# Patient Record
Sex: FEMALE | Race: OTHER | HISPANIC OR LATINO
[De-identification: names, ages, dates, MRNs, and addresses within clinical notes are randomized per-mention and may not be internally consistent; named-entity substitution may affect disease eponyms.]

---

## 2018-08-03 ENCOUNTER — APPOINTMENT (OUTPATIENT)
Dept: VASCULAR SURGERY | Facility: CLINIC | Age: 73
End: 2018-08-03
Payer: MEDICARE

## 2018-08-03 ENCOUNTER — APPOINTMENT (OUTPATIENT)
Dept: ORTHOPEDIC SURGERY | Facility: CLINIC | Age: 73
End: 2018-08-03
Payer: MEDICARE

## 2018-08-03 VITALS
DIASTOLIC BLOOD PRESSURE: 80 MMHG | WEIGHT: 189 LBS | SYSTOLIC BLOOD PRESSURE: 160 MMHG | HEIGHT: 62 IN | HEART RATE: 94 BPM | BODY MASS INDEX: 34.78 KG/M2

## 2018-08-03 DIAGNOSIS — M79.89 PAIN IN LEG, UNSPECIFIED: ICD-10-CM

## 2018-08-03 DIAGNOSIS — Z86.39 PERSONAL HISTORY OF OTHER ENDOCRINE, NUTRITIONAL AND METABOLIC DISEASE: ICD-10-CM

## 2018-08-03 DIAGNOSIS — Z86.79 PERSONAL HISTORY OF OTHER DISEASES OF THE CIRCULATORY SYSTEM: ICD-10-CM

## 2018-08-03 DIAGNOSIS — M79.606 PAIN IN LEG, UNSPECIFIED: ICD-10-CM

## 2018-08-03 DIAGNOSIS — Z80.3 FAMILY HISTORY OF MALIGNANT NEOPLASM OF BREAST: ICD-10-CM

## 2018-08-03 PROCEDURE — 73564 X-RAY EXAM KNEE 4 OR MORE: CPT | Mod: 50

## 2018-08-03 PROCEDURE — 99204 OFFICE O/P NEW MOD 45 MIN: CPT

## 2018-08-03 PROCEDURE — 99203 OFFICE O/P NEW LOW 30 MIN: CPT

## 2018-08-03 PROCEDURE — 93970 EXTREMITY STUDY: CPT

## 2018-08-03 RX ORDER — DICLOFENAC SODIUM 16.05 MG/ML
1.5 SOLUTION TOPICAL
Qty: 1 | Refills: 0 | Status: ACTIVE | COMMUNITY
Start: 2018-08-03 | End: 1900-01-01

## 2018-08-03 RX ORDER — LOSARTAN POTASSIUM 100 MG/1
TABLET, FILM COATED ORAL
Refills: 0 | Status: ACTIVE | COMMUNITY

## 2018-08-03 RX ORDER — PANTOPRAZOLE 40 MG/1
40 TABLET, DELAYED RELEASE ORAL
Refills: 0 | Status: ACTIVE | COMMUNITY

## 2018-08-03 RX ORDER — ASPIRIN 81 MG
81 TABLET, DELAYED RELEASE (ENTERIC COATED) ORAL
Refills: 0 | Status: ACTIVE | COMMUNITY

## 2018-08-03 RX ORDER — METOPROLOL TARTRATE 25 MG/1
25 TABLET, FILM COATED ORAL
Refills: 0 | Status: ACTIVE | COMMUNITY

## 2018-08-03 RX ORDER — ROSUVASTATIN CALCIUM 20 MG/1
20 TABLET, FILM COATED ORAL
Refills: 0 | Status: ACTIVE | COMMUNITY

## 2019-06-24 ENCOUNTER — APPOINTMENT (OUTPATIENT)
Dept: ORTHOPEDIC SURGERY | Facility: CLINIC | Age: 74
End: 2019-06-24
Payer: MEDICARE

## 2019-06-24 PROCEDURE — 73564 X-RAY EXAM KNEE 4 OR MORE: CPT | Mod: 50

## 2019-06-24 PROCEDURE — 99214 OFFICE O/P EST MOD 30 MIN: CPT

## 2019-06-24 RX ORDER — DICLOFENAC SODIUM 16.05 MG/ML
1.5 SOLUTION TOPICAL
Qty: 1 | Refills: 0 | Status: ACTIVE | COMMUNITY
Start: 2019-06-24 | End: 1900-01-01

## 2019-06-24 RX ORDER — DICLOFENAC SODIUM 10 MG/G
1 GEL TOPICAL DAILY
Qty: 1 | Refills: 2 | Status: ACTIVE | COMMUNITY
Start: 2019-06-24 | End: 1900-01-01

## 2019-06-24 NOTE — PHYSICAL EXAM
[de-identified] : 4 views of the affected bilateral knee (standing AP, flexing standing AP, 30degree flexed lateral, 0degree lateral, sunrise view)\par Aug 2018\par  demonstrate:\par \par RIGHT\par There is severe medial weightbearing asymmetric narrowing\par moderate osteophytic lipping\par Trace suprapatellar effusion\par Mild patellofemoral joint space loss without evidence of tilt or subluxation on sunrise view\par Normal soft tissue density\par Otherwise normal osseous bone structure without fracture or dislocation\par \par LEFT\par There is severe medial weightbearing asymmetric narrowing\par Large osteophytic lipping\par Trace suprapatellar effusion\par Trace patellofemoral joint space loss without evidence of tilt or subluxation on sunrise view\par Normal soft tissue density\par Otherwise normal osseous bone structure without fracture or dislocation\par \par 4 views of the affected bilateral knee (standing AP, flexing standing AP, 30degree flexed lateral, 0degree lateral, sunrise view)\par were ordered, obtained and evaluated by myself today and \par demonstrate:\par \par RIGHT\par There is severe medial weightbearing asymmetric narrowing\par moderate osteophytic lipping\par Trace suprapatellar effusion\par Mild patellofemoral joint space loss without evidence of tilt or subluxation on sunrise view\par Normal soft tissue density\par Otherwise normal osseous bone structure without fracture or dislocation\par \par LEFT\par There is severe medial weightbearing asymmetric narrowing\par Large osteophytic lipping\par Trace suprapatellar effusion\par Trace patellofemoral joint space loss without evidence of tilt or subluxation on sunrise view\par Normal soft tissue density\par Otherwise normal osseous bone structure without fracture or dislocation [de-identified] : Physical Examination\par General: well nourished, in no acute distress, alert and oriented to person, place and time\par Psychiatric: normal mood and affect, no abnormal movements or speech patterns\par Eyes: vision intact wo glasses\par Throat: no thyromegaly\par Lymph: no enlarged nodes, no lymphedema in extremity\par Respiratory: no wheezing, no shortness of breath with ambulation\par Cardiac: no cardiac leg swelling, 2+ peripheral pulses\par Neurology: normal gross sensation in extremities to light touch\par Abdomen: soft, non-tender, tympanic, no masses\par \par Musculoskeletal Examination\par Ambulation	BL LE antalgic gait, + assistive devices\par \par Knee			Right			Left\par General\par      Swelling/Deformity	nonpitting swelling			nonpitting swelling\par      Skin			normal			normal\par      Erythema		-			-\par      Standing Alignment	varus			varus\par      Effusion		trace			trace\par Range of Motion\par      Hip			full painless ROM		full painless ROM\par      Knee Flexion		100			100\par      Knee Extension	0			0\par Patella\par      J Sign		-			-\par      Quad Medial/Lateral	1/1 1/1\par      Apprehension		-			-\par      Heriberto's		+			+\par      Grind Sign		+			+\par      Crepitus		+			+\par Palpation\par      Medial Joint Line	+			+\par      Medial Fem Condyle	-			-\par      Lateral Joint Line	+			+\par      Quad Tendon		-			-\par      Patella Tendon	-			-\par      Medial Patella		-			-\par      Lateral Patella 	+			+\par      Posterior Knee	-			-\par Ligamentous\par      Varus @ 0° / 30°	-/-			-/-\par      Valgus @ 0° / 30°	-/-			-/-\par      Lachman		-			-\par      Pivot Shift		-			-\par      Anterior Drawer	-			-\par      Posterior Drawer	-			-\par Meniscus\par      Daniel		-			-\par      Flexion Pinch		-			-\par Strength Examination/Atrophy\par      Hip Flexors 		5+			5+\par      Quadriceps		5+			5+\par      Hamstring		5+			5+\par      Tibialis Anterior	5+			5+\par      Achilles/Soleus	5+			5+\par Sensation\par      Deep Peroneal	normal			normal\par      Superficial Peroneal 	normal			normal\par      Sural  		normal			normal\par      Posterior Tibial 	normal			normal\par      Saphneous 		normal			normal\par Pulses\par      DP			2+			2+\par

## 2019-06-24 NOTE — HISTORY OF PRESENT ILLNESS
[de-identified] : CC bilateral knee\par Consult Dr Robinson Silveira\par HPI 73 yo female right HD presents for FU chronic onset of in a year as a constant pain in the entire bilateral knee without injury. The pain is worse, and rated a 10 out of 10, described as dull aching sharp throbbing, with radiation down the calf laterally more on the left. Nothing makes the pain better and everything makes the pain worse. The patient reports associated symptoms of top click grinding swelling weakness instability. The patient - pain at night affecting sleep, and + similar pain previously treated nonoperatively.\par \par The patient has tried the following treatments:\par Activity modification	+\par Ice/Compression  	+\par Braces    		-\par Nsaids    		+\par Physical Therapy 	-\par Cortisone Injection	-\par Arthroscopy		-\par \par knee pain better, didn't do pt, took nsaids. concerned leg swelling increasing slowly, doesn't remember what vascular doctor told her\par \par Review of Systems is positive for the above musculoskeletal symptoms and is otherwise non-contributory for general, constitutional, psychiatric, neurologic, HEENT, cardiac, respiratory, gastrointestinal, reproductive, lymphatic, and dermatologic complaints.\par \par \par

## 2019-06-24 NOTE — DISCUSSION/SUMMARY
[de-identified] : Bilateral knee osteoarthritis\par \par The patient and I discussed the causes and progression of degenerative joint disease of the knee. Models, diagrams and drawings were used in the discussion. Treatment can include conservative non-operative management and surgical options. Conservative management includes weight loss, activity modification, physical therapy to improve motion and strength in the muscles around the knee and the body's core, PO and topical NSAIDs, corticosteroid and/or viscosupplementation intra-articular injections. If the patient fails to improve with non-operative management, surgical management is possible. Depending upon the patient's age, BMI, activity level, degree and location of arthrosis different surgical options are possible including arthroscopic debridement with chondroplasty, high-tibial osteotomy, unicondylar/partial arthroplasty, and total joint arthroplasty.\par \par Physical therapy was prescribed for knee ROM exercises, strengthening exercises, deep tissue massage, core strengthening, hip abductor strengthening, VMO strengthening, modalities PRN, and home exercises\par \par The patient was prescribed Diclofenac topical liquid/creme non-steroidal anti-inflammatory medication. 1-2 pumps twice daily and apply to area with pain. There is low systemic absorption of the medication but risks while reduced remain were discussed and include but not limited to renal damage and GI ulceration and bleeding. They were warned to stop the medication if worsening buring skin or gastric pain or dizziness or other side effects. Also to immediately stop the medication and seek appriate medical attention if any severe stomach ache, gastritis, black/red vomit, black/red stools or any other medical concern.\par \par Patient was offered a corticosteroid injection and she declined\par patient not interested in surgery.\par \par asked about orthopedic treatment of leg swelling, discussed she should speak to PCP or vascular about leg swelling\par \par Follow up\par PRN

## 2019-07-06 ENCOUNTER — RECORD ABSTRACTING (OUTPATIENT)
Age: 74
End: 2019-07-06

## 2019-07-06 DIAGNOSIS — Z87.19 PERSONAL HISTORY OF OTHER DISEASES OF THE DIGESTIVE SYSTEM: ICD-10-CM

## 2019-07-06 DIAGNOSIS — L29.2 PRURITUS VULVAE: ICD-10-CM

## 2019-07-06 DIAGNOSIS — Z98.890 OTHER SPECIFIED POSTPROCEDURAL STATES: ICD-10-CM

## 2019-07-06 DIAGNOSIS — Z92.89 PERSONAL HISTORY OF OTHER MEDICAL TREATMENT: ICD-10-CM

## 2019-07-06 DIAGNOSIS — Z82.49 FAMILY HISTORY OF ISCHEMIC HEART DISEASE AND OTHER DISEASES OF THE CIRCULATORY SYSTEM: ICD-10-CM

## 2019-07-06 DIAGNOSIS — N95.2 POSTMENOPAUSAL ATROPHIC VAGINITIS: ICD-10-CM

## 2019-07-06 LAB — CYTOLOGY CVX/VAG DOC THIN PREP: NORMAL

## 2019-07-06 RX ORDER — PHENOL 1.4 %
600-400 AEROSOL, SPRAY (ML) MUCOUS MEMBRANE
Refills: 0 | Status: ACTIVE | COMMUNITY

## 2019-07-08 ENCOUNTER — LABORATORY RESULT (OUTPATIENT)
Age: 74
End: 2019-07-08

## 2019-07-09 ENCOUNTER — APPOINTMENT (OUTPATIENT)
Dept: OBGYN | Facility: CLINIC | Age: 74
End: 2019-07-09
Payer: MEDICARE

## 2019-07-09 VITALS
HEART RATE: 91 BPM | TEMPERATURE: 97.6 F | OXYGEN SATURATION: 98 % | BODY MASS INDEX: 36.58 KG/M2 | WEIGHT: 200 LBS | DIASTOLIC BLOOD PRESSURE: 77 MMHG | SYSTOLIC BLOOD PRESSURE: 131 MMHG

## 2019-07-09 PROCEDURE — 99387 INIT PM E/M NEW PAT 65+ YRS: CPT

## 2019-07-24 ENCOUNTER — APPOINTMENT (OUTPATIENT)
Dept: ORTHOPEDIC SURGERY | Facility: CLINIC | Age: 74
End: 2019-07-24

## 2019-10-18 ENCOUNTER — APPOINTMENT (OUTPATIENT)
Dept: VASCULAR SURGERY | Facility: CLINIC | Age: 74
End: 2019-10-18
Payer: MEDICARE

## 2019-10-18 VITALS
HEART RATE: 96 BPM | DIASTOLIC BLOOD PRESSURE: 78 MMHG | WEIGHT: 200 LBS | BODY MASS INDEX: 36.8 KG/M2 | SYSTOLIC BLOOD PRESSURE: 125 MMHG | HEIGHT: 62 IN

## 2019-10-18 PROCEDURE — 99213 OFFICE O/P EST LOW 20 MIN: CPT

## 2019-10-18 PROCEDURE — 93970 EXTREMITY STUDY: CPT

## 2019-10-18 NOTE — ASSESSMENT
[FreeTextEntry1] : Patient with left Baker's cyst. No significant arterial or venous insufficiency. Recommend orthopedic evaluation.

## 2019-10-18 NOTE — PHYSICAL EXAM
[JVD] : no jugular venous distention  [Normal Breath Sounds] : Normal breath sounds [Normal Heart Sounds] : normal heart sounds [2+] : left 2+ [Ankle Swelling (On Exam)] : present [Ankle Swelling On The Left] : of the left ankle [Ankle Swelling On The Right] : mild [Varicose Veins Of Lower Extremities] : not present [] : not present [Skin Ulcer] : no ulcer [Abdomen Masses] : No abdominal masses [Oriented to Place] : oriented to place

## 2019-10-18 NOTE — HISTORY OF PRESENT ILLNESS
[FreeTextEntry1] : Patient is a 72-year-old female with past medical history significant for obesity and arthritis and hypertension presenting with complaints of left leg swelling and left leg pain. The pain involves the posterior left knee and left calf area. No complaint of rest pain or claudication. No history of smoking. No history of diabetes. No history of cardiac disease. Symptoms have been present for many months. Symptoms are getting worse.

## 2020-07-13 ENCOUNTER — APPOINTMENT (OUTPATIENT)
Dept: ORTHOPEDIC SURGERY | Facility: CLINIC | Age: 75
End: 2020-07-13
Payer: MEDICARE

## 2020-07-16 ENCOUNTER — APPOINTMENT (OUTPATIENT)
Dept: ORTHOPEDIC SURGERY | Facility: CLINIC | Age: 75
End: 2020-07-16
Payer: MEDICARE

## 2020-07-16 VITALS
HEART RATE: 100 BPM | DIASTOLIC BLOOD PRESSURE: 88 MMHG | BODY MASS INDEX: 34.78 KG/M2 | TEMPERATURE: 97.2 F | SYSTOLIC BLOOD PRESSURE: 138 MMHG | HEIGHT: 62 IN | WEIGHT: 189 LBS

## 2020-07-16 PROCEDURE — 20612 ASPIRATE/INJ GANGLION CYST: CPT

## 2020-07-16 PROCEDURE — 99203 OFFICE O/P NEW LOW 30 MIN: CPT | Mod: 25

## 2020-07-20 ENCOUNTER — APPOINTMENT (OUTPATIENT)
Dept: OBGYN | Facility: CLINIC | Age: 75
End: 2020-07-20
Payer: MEDICARE

## 2020-07-20 VITALS
BODY MASS INDEX: 34.57 KG/M2 | OXYGEN SATURATION: 97 % | DIASTOLIC BLOOD PRESSURE: 76 MMHG | TEMPERATURE: 98.9 F | SYSTOLIC BLOOD PRESSURE: 149 MMHG | HEART RATE: 98 BPM | WEIGHT: 189 LBS

## 2020-07-20 DIAGNOSIS — Z78.9 OTHER SPECIFIED HEALTH STATUS: ICD-10-CM

## 2020-07-20 DIAGNOSIS — Z87.39 PERSONAL HISTORY OF OTHER DISEASES OF THE MUSCULOSKELETAL SYSTEM AND CONNECTIVE TISSUE: ICD-10-CM

## 2020-07-20 DIAGNOSIS — Z82.61 FAMILY HISTORY OF ARTHRITIS: ICD-10-CM

## 2020-07-20 PROCEDURE — 99397 PER PM REEVAL EST PAT 65+ YR: CPT

## 2020-07-20 RX ORDER — NITROFURANTOIN (MONOHYDRATE/MACROCRYSTALS) 25; 75 MG/1; MG/1
100 CAPSULE ORAL
Qty: 14 | Refills: 0 | Status: COMPLETED | COMMUNITY
Start: 2020-06-25

## 2020-07-20 RX ORDER — AMLODIPINE BESYLATE 2.5 MG/1
2.5 TABLET ORAL
Qty: 90 | Refills: 0 | Status: ACTIVE | COMMUNITY
Start: 2020-05-29

## 2020-07-20 NOTE — PHYSICAL EXAM
[Awake] : awake [Acute Distress] : no acute distress [Mass] : no breast mass [Alert] : alert [Nipple Discharge] : no nipple discharge [Axillary LAD] : no axillary lymphadenopathy [Soft] : soft [Tender] : non tender [Oriented x3] : oriented to person, place, and time [Normal] : cervix [No Bleeding] : there was no active vaginal bleeding [Uterine Adnexae] : were not tender and not enlarged

## 2020-07-20 NOTE — HISTORY OF PRESENT ILLNESS
[Normal Amount/Duration] : was of a normal amount and duration [Regular Cycle Intervals] : periods have been regular [Definite:  ___ (Date)] : the last menstrual period was [unfilled] [Male ___] : [unfilled] male [Menarche Age: ____] : age at menarche was [unfilled] [Sexually Active] : is sexually active

## 2020-08-14 ENCOUNTER — APPOINTMENT (OUTPATIENT)
Dept: VASCULAR SURGERY | Facility: CLINIC | Age: 75
End: 2020-08-14
Payer: MEDICARE

## 2020-08-14 VITALS
OXYGEN SATURATION: 98 % | HEART RATE: 97 BPM | TEMPERATURE: 97 F | WEIGHT: 189 LBS | DIASTOLIC BLOOD PRESSURE: 78 MMHG | BODY MASS INDEX: 34.78 KG/M2 | SYSTOLIC BLOOD PRESSURE: 144 MMHG | HEIGHT: 62 IN

## 2020-08-14 VITALS — TEMPERATURE: 96.4 F

## 2020-08-14 PROCEDURE — 99213 OFFICE O/P EST LOW 20 MIN: CPT

## 2020-08-14 NOTE — HISTORY OF PRESENT ILLNESS
[FreeTextEntry1] : 75 yo female with history of htn, hld presents for follow up of lower extremity edema.  pt states that by the end of the day the swelling increased and the leg feels heavy.  pt states that the left is worse then the right.  pt attempted to use the compression stockings without much improvement \par pt denies any history of dvt , open wounds or claudications

## 2020-08-14 NOTE — ASSESSMENT
[FreeTextEntry1] : 73 yo female with history of htn, hld presents for follow up of lower extremity edema.  pt states that by the end of the day the swelling increased and the leg feels heavy.\par \par venous duplex from previous visit shows no evidence of venous insufficency\par pt currently on amlodipine pt advised to follow up with pcp for possible medication change in addition to compression and elevation \par pt to follow up as needed

## 2020-08-14 NOTE — PHYSICAL EXAM
[2+] : left 2+ [Ankle Swelling On The Right] : mild [Varicose Veins Of Lower Extremities] : bilaterally [Alert] : alert [No Rash or Lesion] : No rash or lesion [Calm] : calm [Ankle Swelling (On Exam)] : not present [] : not present [Skin Ulcer] : no ulcer [de-identified] : appears well  [de-identified] : no calf tenderness

## 2021-06-09 ENCOUNTER — APPOINTMENT (OUTPATIENT)
Dept: ORTHOPEDIC SURGERY | Facility: CLINIC | Age: 76
End: 2021-06-09
Payer: MEDICARE

## 2021-06-09 VITALS
HEIGHT: 62 IN | BODY MASS INDEX: 32.2 KG/M2 | HEART RATE: 89 BPM | SYSTOLIC BLOOD PRESSURE: 137 MMHG | WEIGHT: 175 LBS | DIASTOLIC BLOOD PRESSURE: 74 MMHG

## 2021-06-09 DIAGNOSIS — M17.0 BILATERAL PRIMARY OSTEOARTHRITIS OF KNEE: ICD-10-CM

## 2021-06-09 DIAGNOSIS — M71.22 SYNOVIAL CYST OF POPLITEAL SPACE [BAKER], LEFT KNEE: ICD-10-CM

## 2021-06-09 PROCEDURE — 99072 ADDL SUPL MATRL&STAF TM PHE: CPT

## 2021-06-09 PROCEDURE — 73030 X-RAY EXAM OF SHOULDER: CPT | Mod: LT

## 2021-06-09 PROCEDURE — 99213 OFFICE O/P EST LOW 20 MIN: CPT | Mod: 25

## 2021-06-09 PROCEDURE — 20610 DRAIN/INJ JOINT/BURSA W/O US: CPT | Mod: LT

## 2021-07-14 ENCOUNTER — APPOINTMENT (OUTPATIENT)
Dept: ORTHOPEDIC SURGERY | Facility: CLINIC | Age: 76
End: 2021-07-14

## 2021-09-22 ENCOUNTER — OUTPATIENT (OUTPATIENT)
Dept: OUTPATIENT SERVICES | Facility: HOSPITAL | Age: 76
LOS: 1 days | End: 2021-09-22
Payer: MEDICARE

## 2021-09-22 VITALS
RESPIRATION RATE: 17 BRPM | HEART RATE: 88 BPM | DIASTOLIC BLOOD PRESSURE: 72 MMHG | HEIGHT: 61 IN | TEMPERATURE: 98 F | SYSTOLIC BLOOD PRESSURE: 154 MMHG | WEIGHT: 175.05 LBS | OXYGEN SATURATION: 96 %

## 2021-09-22 DIAGNOSIS — D17.0 BENIGN LIPOMATOUS NEOPLASM OF SKIN AND SUBCUTANEOUS TISSUE OF HEAD, FACE AND NECK: ICD-10-CM

## 2021-09-22 DIAGNOSIS — Z90.49 ACQUIRED ABSENCE OF OTHER SPECIFIED PARTS OF DIGESTIVE TRACT: Chronic | ICD-10-CM

## 2021-09-22 DIAGNOSIS — Z98.891 HISTORY OF UTERINE SCAR FROM PREVIOUS SURGERY: Chronic | ICD-10-CM

## 2021-09-22 DIAGNOSIS — Z29.9 ENCOUNTER FOR PROPHYLACTIC MEASURES, UNSPECIFIED: ICD-10-CM

## 2021-09-22 DIAGNOSIS — I10 ESSENTIAL (PRIMARY) HYPERTENSION: ICD-10-CM

## 2021-09-22 DIAGNOSIS — K40.90 UNILATERAL INGUINAL HERNIA, WITHOUT OBSTRUCTION OR GANGRENE, NOT SPECIFIED AS RECURRENT: Chronic | ICD-10-CM

## 2021-09-22 DIAGNOSIS — Z01.818 ENCOUNTER FOR OTHER PREPROCEDURAL EXAMINATION: ICD-10-CM

## 2021-09-22 PROCEDURE — G0463: CPT

## 2021-09-22 NOTE — H&P PST ADULT - NSICDXPASTMEDICALHX_GEN_ALL_CORE_FT
PAST MEDICAL HISTORY:  Benign lipomatous neoplasm of skin and subcutaneous tissue of head, face and neck     GERD (gastroesophageal reflux disease)     HTN (hypertension)     Hypercholesteremia

## 2021-09-22 NOTE — H&P PST ADULT - NSICDXFAMILYHX_GEN_ALL_CORE_FT
FAMILY HISTORY:  Mother  Still living? No  FH: breast cancer, Age at diagnosis: Age Unknown    Sibling  Still living? No  Family hx of colon cancer, Age at diagnosis: Age Unknown

## 2021-09-22 NOTE — H&P PST ADULT - HISTORY OF PRESENT ILLNESS
75 year old pleasant female with history of HTN, HDL, presents with benign lipomatous neoplasm of skin and subcutaneous tissue of head ,face and neck. Pt noticed this lump for sometime and brought it to her primary doctor's attention. Pt seen by surgeon and ow schedule for excision and closure of left supraclavicular mass on 9/29/2021. Pt also instructed on the use of chlorhexidine wash the day of surgery. Pt verbalized understanding 75 year old pleasant female with history of HTN, HDL, presents with benign lipomatous neoplasm of skin and subcutaneous tissue of head ,face and neck. Pt noticed this lump for sometime and brought it to her primary doctor's attention. Pt seen by surgeon and ow schedule for excision and closure of left supraclavicular mass on 9/29/2021. Pt also instructed on the use of chlorhexidine wash the day of surgery. Pt verbalized understanding. C/O pain.

## 2021-09-22 NOTE — H&P PST ADULT - NSANTHOSAYNRD_GEN_A_CORE
No. MOISE screening performed.  STOP BANG Legend: 0-2 = LOW Risk; 3-4 = INTERMEDIATE Risk; 5-8 = HIGH Risk

## 2021-09-22 NOTE — H&P PST ADULT - NEGATIVE GENERAL GENITOURINARY SYMPTOMS
no hematuria/no renal colic/no flank pain L/no flank pain R/no urine discoloration/no gas in urine/no bladder infections

## 2021-09-22 NOTE — H&P PST ADULT - ASSESSMENT
75 yr old pleasant woman with history of HTN, hypercholesteremia GERD presents with benign lipomatous neoplasm of skin and subcutaneous tissue of head, face and neck. Pt seen by surgeon for this lump in her neck area. Pt schedule for excision and closure of left supraclavicular mass on 9/29/2021.

## 2021-09-25 DIAGNOSIS — Z01.818 ENCOUNTER FOR OTHER PREPROCEDURAL EXAMINATION: ICD-10-CM

## 2021-09-26 ENCOUNTER — APPOINTMENT (OUTPATIENT)
Dept: DISASTER EMERGENCY | Facility: CLINIC | Age: 76
End: 2021-09-26

## 2021-09-28 LAB — SARS-COV-2 N GENE NPH QL NAA+PROBE: NOT DETECTED

## 2021-09-29 ENCOUNTER — RESULT REVIEW (OUTPATIENT)
Age: 76
End: 2021-09-29

## 2021-09-29 ENCOUNTER — OUTPATIENT (OUTPATIENT)
Dept: OUTPATIENT SERVICES | Facility: HOSPITAL | Age: 76
LOS: 1 days | End: 2021-09-29
Payer: MEDICARE

## 2021-09-29 VITALS
RESPIRATION RATE: 16 BRPM | DIASTOLIC BLOOD PRESSURE: 52 MMHG | WEIGHT: 175.05 LBS | HEIGHT: 61 IN | OXYGEN SATURATION: 97 % | HEART RATE: 84 BPM | TEMPERATURE: 98 F | SYSTOLIC BLOOD PRESSURE: 145 MMHG

## 2021-09-29 VITALS
RESPIRATION RATE: 16 BRPM | SYSTOLIC BLOOD PRESSURE: 106 MMHG | HEART RATE: 82 BPM | DIASTOLIC BLOOD PRESSURE: 58 MMHG | TEMPERATURE: 98 F | OXYGEN SATURATION: 96 %

## 2021-09-29 DIAGNOSIS — Z90.49 ACQUIRED ABSENCE OF OTHER SPECIFIED PARTS OF DIGESTIVE TRACT: Chronic | ICD-10-CM

## 2021-09-29 DIAGNOSIS — K40.90 UNILATERAL INGUINAL HERNIA, WITHOUT OBSTRUCTION OR GANGRENE, NOT SPECIFIED AS RECURRENT: Chronic | ICD-10-CM

## 2021-09-29 DIAGNOSIS — D17.0 BENIGN LIPOMATOUS NEOPLASM OF SKIN AND SUBCUTANEOUS TISSUE OF HEAD, FACE AND NECK: ICD-10-CM

## 2021-09-29 DIAGNOSIS — Z98.891 HISTORY OF UTERINE SCAR FROM PREVIOUS SURGERY: Chronic | ICD-10-CM

## 2021-09-29 PROCEDURE — 14041 TIS TRNFR F/C/C/M/N/A/G/H/F: CPT

## 2021-09-29 PROCEDURE — 88305 TISSUE EXAM BY PATHOLOGIST: CPT | Mod: 26

## 2021-09-29 PROCEDURE — 21554 EXC NECK TUM DEEP 5 CM/>: CPT

## 2021-09-29 PROCEDURE — 88305 TISSUE EXAM BY PATHOLOGIST: CPT

## 2021-09-29 RX ORDER — ACETAMINOPHEN 500 MG
650 TABLET ORAL EVERY 6 HOURS
Refills: 0 | Status: DISCONTINUED | OUTPATIENT
Start: 2021-09-29 | End: 2021-10-06

## 2021-09-29 RX ORDER — SODIUM CHLORIDE 9 MG/ML
3 INJECTION INTRAMUSCULAR; INTRAVENOUS; SUBCUTANEOUS EVERY 8 HOURS
Refills: 0 | Status: DISCONTINUED | OUTPATIENT
Start: 2021-09-29 | End: 2021-09-29

## 2021-09-29 RX ORDER — FENTANYL CITRATE 50 UG/ML
25 INJECTION INTRAVENOUS
Refills: 0 | Status: DISCONTINUED | OUTPATIENT
Start: 2021-09-29 | End: 2021-09-29

## 2021-09-29 RX ORDER — FENTANYL CITRATE 50 UG/ML
50 INJECTION INTRAVENOUS
Refills: 0 | Status: DISCONTINUED | OUTPATIENT
Start: 2021-09-29 | End: 2021-09-29

## 2021-09-29 RX ADMIN — SODIUM CHLORIDE 3 MILLILITER(S): 9 INJECTION INTRAMUSCULAR; INTRAVENOUS; SUBCUTANEOUS at 12:11

## 2021-09-29 NOTE — ASU DISCHARGE PLAN (ADULT/PEDIATRIC) - ASU DC SPECIAL INSTRUCTIONSFT
Please follow-up with your surgeon in 1 week. Drink plenty of fluids and rest as needed. Call for any fever over 101, nausea, vomiting, severe pain, no passing of gas or bowel movement.       SURGICAL SITES : Remove outer dressing and keep white steri-strips in place allowing them to fall off on their own. You may shower 48 hours post-operatively but do not bathe or soak in the water for 1-2 weeks; pat dry. If you notice any signs of surgical site infection (ie. redness, swelling, pain, pus drainage), please seek medical care immediately.       PAIN CONTROL: You may take Motrin 600mg-800mg (with food) every 6 hours or Tylenol 650mg-1000mg every 6 hours as needed for mild pain. Stagger one medication 3 hours after the other for maximum pain control. Maximum daily dose of Tylenol should not exceed 4000mg/day.  You may take your prescribed pain medication given to you by Dr. Mandujano for any severe breakthrough pain not that is not relieved by Tylenol/Motrin. Do not drive or make important decisions while taking this medication and do not take more than 4 pills in 24 hours. Please follow-up with your surgeon in 2 weeks; call the office to make an appointment. Drink plenty of fluids and rest as needed. Call for any fever over 101, nausea, vomiting or severe pain.      SURGICAL SITES : Remove outer dressing and keep white steri-strips in place allowing them to fall off on their own. You may shower 48 hours post-operatively but do not bathe or soak in the water for 1-2 weeks; pat dry. If you notice any signs of surgical site infection (ie. redness, swelling, pain, pus drainage), please seek medical care immediately.       PAIN CONTROL: You may take Motrin 600mg-800mg (with food) every 6 hours or Tylenol 650mg-1000mg every 6 hours as needed for mild pain. Stagger one medication 3 hours after the other for maximum pain control. Maximum daily dose of Tylenol should not exceed 4000mg/day.  You may take your prescribed pain medication given to you by Dr. Mandujano for any severe breakthrough pain not that is not relieved by Tylenol/Motrin. Do not drive or make important decisions while taking this medication and do not take more than 4 pills in 24 hours. Please follow-up with your surgeon in 2 weeks; call the office to make an appointment. Call for any fever over 101, nausea, vomiting or severe pain.      SURGICAL SITES :  Remove outer dressing and keep white steri-strips in place allowing them to fall off on their own. You may shower 48 hours post-operatively but do not bathe or soak in the water for 1-2 weeks; pat dry. If you notice any signs of surgical site infection (ie. redness, swelling, pain, pus drainage), please seek medical care immediately.       PAIN CONTROL:   You may take Motrin 600mg-800mg (with food) every 6 hours or Tylenol 650mg-1000mg every 6 hours as needed for mild pain. Stagger one medication 3 hours after the other for maximum pain control. Maximum daily dose of Tylenol should not exceed 4000mg/day.    You may take your prescribed pain medication (T#3) given to you by Dr. Mandujano for any severe breakthrough pain not that is not relieved by Tylenol/Motrin. Do not drive or make important decisions while taking this medication and do not take more than 4 pills in 24 hours.    ANTIBIOTICS:  Please take your prescribed antibiotics as directed by your surgeon.

## 2021-09-29 NOTE — ASU DISCHARGE PLAN (ADULT/PEDIATRIC) - CARE PROVIDER_API CALL
Vadim Mandujano  PLASTIC SURGERY  [address] [facility] [address], [address]  [place], [place] [id number]  Phone: [phone]  Fax: [phone]  Follow Up Time:

## 2021-09-29 NOTE — BRIEF OPERATIVE NOTE - DISPOSITION
What Type Of Note Output Would You Prefer (Optional)?: Bullet Format
Is This A New Presentation, Or A Follow-Up?: Skin Lesion
pacu-home

## 2021-09-29 NOTE — BRIEF OPERATIVE NOTE - NSICDXBRIEFPOSTOP_GEN_ALL_CORE_FT
POST-OP DIAGNOSIS:  Supraclavicular mass 29-Sep-2021 15:35:56 left supraclavicular lipoma Monae Alansi

## 2021-10-05 LAB — SURGICAL PATHOLOGY STUDY: SIGNIFICANT CHANGE UP

## 2021-10-20 PROBLEM — I10 ESSENTIAL (PRIMARY) HYPERTENSION: Chronic | Status: ACTIVE | Noted: 2021-09-22

## 2021-10-20 PROBLEM — K21.9 GASTRO-ESOPHAGEAL REFLUX DISEASE WITHOUT ESOPHAGITIS: Chronic | Status: ACTIVE | Noted: 2021-09-22

## 2021-10-20 PROBLEM — E78.00 PURE HYPERCHOLESTEROLEMIA, UNSPECIFIED: Chronic | Status: ACTIVE | Noted: 2021-09-22

## 2021-11-04 ENCOUNTER — APPOINTMENT (OUTPATIENT)
Dept: OBGYN | Facility: CLINIC | Age: 76
End: 2021-11-04
Payer: MEDICARE

## 2021-11-04 VITALS
WEIGHT: 195 LBS | TEMPERATURE: 97.2 F | BODY MASS INDEX: 35.88 KG/M2 | DIASTOLIC BLOOD PRESSURE: 69 MMHG | OXYGEN SATURATION: 98 % | SYSTOLIC BLOOD PRESSURE: 126 MMHG | HEIGHT: 62 IN | HEART RATE: 75 BPM

## 2021-11-04 DIAGNOSIS — Z01.419 ENCOUNTER FOR GYNECOLOGICAL EXAMINATION (GENERAL) (ROUTINE) W/OUT ABNORMAL FINDINGS: ICD-10-CM

## 2021-11-04 PROCEDURE — 99397 PER PM REEVAL EST PAT 65+ YR: CPT

## 2021-11-04 RX ORDER — CLOTRIMAZOLE AND BETAMETHASONE DIPROPIONATE 10; .5 MG/G; MG/G
1-0.05 CREAM TOPICAL TWICE DAILY
Qty: 1 | Refills: 1 | Status: ACTIVE | COMMUNITY
Start: 2021-11-04 | End: 1900-01-01

## 2021-11-04 NOTE — PHYSICAL EXAM
[Chaperone Present] : A chaperone was present in the examining room during all aspects of the physical examination [Appropriately responsive] : appropriately responsive [No Acute Distress] : no acute distress [No Lymphadenopathy] : no lymphadenopathy [Regular Rate Rhythm] : regular rate rhythm [No Murmurs] : no murmurs [Clear to Auscultation B/L] : clear to auscultation bilaterally [Non-tender] : non-tender [Non-distended] : non-distended [No HSM] : No HSM [No Lesions] : no lesions [No Mass] : no mass [Examination Of The Breasts] : a normal appearance [No Masses] : no breast masses were palpable [Labia Majora] : normal [Labia Minora] : normal [Awake] : awake [Alert] : alert [Acute Distress] : no acute distress [Mass] : no breast mass [Nipple Discharge] : no nipple discharge [Axillary LAD] : no axillary lymphadenopathy [Soft] : soft [Tender] : non tender [Oriented x3] : oriented to person, place, and time [Normal] : uterus [No Bleeding] : there was no active vaginal bleeding [Uterine Adnexae] : were not tender and not enlarged

## 2021-11-05 LAB — HPV HIGH+LOW RISK DNA PNL CVX: NOT DETECTED

## 2021-11-09 LAB — CYTOLOGY CVX/VAG DOC THIN PREP: ABNORMAL

## 2022-10-16 ENCOUNTER — EMERGENCY (EMERGENCY)
Facility: HOSPITAL | Age: 77
LOS: 1 days | Discharge: HOME | End: 2022-10-16
Attending: EMERGENCY MEDICINE
Payer: MEDICARE

## 2022-10-16 VITALS
OXYGEN SATURATION: 95 % | SYSTOLIC BLOOD PRESSURE: 125 MMHG | DIASTOLIC BLOOD PRESSURE: 78 MMHG | HEART RATE: 65 BPM | RESPIRATION RATE: 18 BRPM | TEMPERATURE: 99 F

## 2022-10-16 VITALS
TEMPERATURE: 98 F | OXYGEN SATURATION: 100 % | SYSTOLIC BLOOD PRESSURE: 186 MMHG | HEIGHT: 61 IN | HEART RATE: 88 BPM | RESPIRATION RATE: 16 BRPM | DIASTOLIC BLOOD PRESSURE: 84 MMHG | WEIGHT: 180.78 LBS

## 2022-10-16 DIAGNOSIS — K40.90 UNILATERAL INGUINAL HERNIA, WITHOUT OBSTRUCTION OR GANGRENE, NOT SPECIFIED AS RECURRENT: Chronic | ICD-10-CM

## 2022-10-16 DIAGNOSIS — Z90.49 ACQUIRED ABSENCE OF OTHER SPECIFIED PARTS OF DIGESTIVE TRACT: Chronic | ICD-10-CM

## 2022-10-16 DIAGNOSIS — Z98.891 HISTORY OF UTERINE SCAR FROM PREVIOUS SURGERY: Chronic | ICD-10-CM

## 2022-10-16 LAB
APTT BLD: 32 SEC — SIGNIFICANT CHANGE UP (ref 27.5–35.5)
BASOPHILS # BLD AUTO: 0.06 K/UL — SIGNIFICANT CHANGE UP (ref 0–0.2)
BASOPHILS NFR BLD AUTO: 0.7 % — SIGNIFICANT CHANGE UP (ref 0–2)
EOSINOPHIL # BLD AUTO: 0.19 K/UL — SIGNIFICANT CHANGE UP (ref 0–0.5)
EOSINOPHIL NFR BLD AUTO: 2.3 % — SIGNIFICANT CHANGE UP (ref 0–6)
HCT VFR BLD CALC: 41.4 % — SIGNIFICANT CHANGE UP (ref 34.5–45)
HGB BLD-MCNC: 13.5 G/DL — SIGNIFICANT CHANGE UP (ref 11.5–15.5)
IMM GRANULOCYTES NFR BLD AUTO: 0.2 % — SIGNIFICANT CHANGE UP (ref 0–0.9)
INR BLD: 0.99 RATIO — SIGNIFICANT CHANGE UP (ref 0.88–1.16)
LYMPHOCYTES # BLD AUTO: 1.53 K/UL — SIGNIFICANT CHANGE UP (ref 1–3.3)
LYMPHOCYTES # BLD AUTO: 18.3 % — SIGNIFICANT CHANGE UP (ref 13–44)
MCHC RBC-ENTMCNC: 29.9 PG — SIGNIFICANT CHANGE UP (ref 27–34)
MCHC RBC-ENTMCNC: 32.6 GM/DL — SIGNIFICANT CHANGE UP (ref 32–36)
MCV RBC AUTO: 91.6 FL — SIGNIFICANT CHANGE UP (ref 80–100)
MONOCYTES # BLD AUTO: 0.66 K/UL — SIGNIFICANT CHANGE UP (ref 0–0.9)
MONOCYTES NFR BLD AUTO: 7.9 % — SIGNIFICANT CHANGE UP (ref 2–14)
NEUTROPHILS # BLD AUTO: 5.9 K/UL — SIGNIFICANT CHANGE UP (ref 1.8–7.4)
NEUTROPHILS NFR BLD AUTO: 70.6 % — SIGNIFICANT CHANGE UP (ref 43–77)
NRBC # BLD: 0 /100 WBCS — SIGNIFICANT CHANGE UP (ref 0–0)
PLATELET # BLD AUTO: 274 K/UL — SIGNIFICANT CHANGE UP (ref 150–400)
PROTHROM AB SERPL-ACNC: 11.8 SEC — SIGNIFICANT CHANGE UP (ref 10.5–13.4)
RBC # BLD: 4.52 M/UL — SIGNIFICANT CHANGE UP (ref 3.8–5.2)
RBC # FLD: 14.4 % — SIGNIFICANT CHANGE UP (ref 10.3–14.5)
WBC # BLD: 8.36 K/UL — SIGNIFICANT CHANGE UP (ref 3.8–10.5)
WBC # FLD AUTO: 8.36 K/UL — SIGNIFICANT CHANGE UP (ref 3.8–10.5)

## 2022-10-16 PROCEDURE — 70450 CT HEAD/BRAIN W/O DYE: CPT | Mod: 26,MA

## 2022-10-16 PROCEDURE — 99285 EMERGENCY DEPT VISIT HI MDM: CPT

## 2022-10-16 NOTE — ED ADULT TRIAGE NOTE - MODE OF ARRIVAL
BP yesterday 114/68, this morning at 4 AM he was awakened and felt lightheaded while laying in bed, checked BP at 7 AM and was 114/58  Recently started on new BP med, losartan-hydrochlorothiazide 50-12 5 mg   Currently states he feels tired from not sleeping well and BP is 114/66  Walk in [employer]

## 2022-10-16 NOTE — ED ADULT TRIAGE NOTE - CHIEF COMPLAINT QUOTE
c/o numbness to rt. face since 2030 as per  , patient said she is nervous c/o numbness to rt. face since 2030 as per  , patient said she is nervous,  no slurred speech, no drooling , denies weakness to upper and lower extremeties , left arm weakness been there for a while as per .

## 2022-10-16 NOTE — ED ADULT TRIAGE NOTE - BSA (M2)
Kid's Delsym for cough  Ibuprofen or tylenol for fever or pain Take withfood  Kid's Mucinex as an expectorant  Cool mist humidifier in room @ nite  Offer fluids freq.  F/U with Pediatrician  
1.81

## 2022-10-16 NOTE — ED ADULT NURSE NOTE - CHIEF COMPLAINT QUOTE
c/o numbness to rt. face since 2030 as per  , patient said she is nervous,  no slurred speech, no drooling , denies weakness to upper and lower extremeties , left arm weakness been there for a while as per .

## 2022-10-16 NOTE — ED ADULT NURSE NOTE - OBJECTIVE STATEMENT
RN Covering notes: pt came ambulatory complaining numbness right side of face no facial droop or slurred speech able to move all extremities, no drift. Seen by Dr Alanis, sent to CT per maricarmener.

## 2022-10-16 NOTE — ED ADULT NURSE NOTE - ED STAT RN HANDOFF DETAILS
report given to RN Ray for cont. care, pt  is difficulty stick unable to get AC access CT head with contrast,  pt refused to have IV to AC benefits explained to pt and spouse.  Dr Alanis made aware unable to place IV to AC .

## 2022-10-17 LAB
ALBUMIN SERPL ELPH-MCNC: 3.8 G/DL — SIGNIFICANT CHANGE UP (ref 3.5–5)
ALP SERPL-CCNC: 110 U/L — SIGNIFICANT CHANGE UP (ref 40–120)
ALT FLD-CCNC: 35 U/L DA — SIGNIFICANT CHANGE UP (ref 10–60)
ANION GAP SERPL CALC-SCNC: 10 MMOL/L — SIGNIFICANT CHANGE UP (ref 5–17)
AST SERPL-CCNC: 45 U/L — HIGH (ref 10–40)
BILIRUB SERPL-MCNC: 0.5 MG/DL — SIGNIFICANT CHANGE UP (ref 0.2–1.2)
BUN SERPL-MCNC: 27 MG/DL — HIGH (ref 7–18)
CALCIUM SERPL-MCNC: 9.4 MG/DL — SIGNIFICANT CHANGE UP (ref 8.4–10.5)
CHLORIDE SERPL-SCNC: 103 MMOL/L — SIGNIFICANT CHANGE UP (ref 96–108)
CO2 SERPL-SCNC: 22 MMOL/L — SIGNIFICANT CHANGE UP (ref 22–31)
CREAT SERPL-MCNC: 0.76 MG/DL — SIGNIFICANT CHANGE UP (ref 0.5–1.3)
EGFR: 82 ML/MIN/1.73M2 — SIGNIFICANT CHANGE UP
GLUCOSE SERPL-MCNC: 104 MG/DL — HIGH (ref 70–99)
POTASSIUM SERPL-MCNC: 5.3 MMOL/L — SIGNIFICANT CHANGE UP (ref 3.5–5.3)
POTASSIUM SERPL-SCNC: 5.3 MMOL/L — SIGNIFICANT CHANGE UP (ref 3.5–5.3)
PROT SERPL-MCNC: 7.8 G/DL — SIGNIFICANT CHANGE UP (ref 6–8.3)
SODIUM SERPL-SCNC: 135 MMOL/L — SIGNIFICANT CHANGE UP (ref 135–145)
TROPONIN I, HIGH SENSITIVITY RESULT: 3.9 NG/L — SIGNIFICANT CHANGE UP

## 2022-10-17 PROCEDURE — 80053 COMPREHEN METABOLIC PANEL: CPT

## 2022-10-17 PROCEDURE — 84484 ASSAY OF TROPONIN QUANT: CPT

## 2022-10-17 PROCEDURE — 93005 ELECTROCARDIOGRAM TRACING: CPT

## 2022-10-17 PROCEDURE — 82962 GLUCOSE BLOOD TEST: CPT

## 2022-10-17 PROCEDURE — 85730 THROMBOPLASTIN TIME PARTIAL: CPT

## 2022-10-17 PROCEDURE — 70450 CT HEAD/BRAIN W/O DYE: CPT | Mod: MA

## 2022-10-17 PROCEDURE — 36415 COLL VENOUS BLD VENIPUNCTURE: CPT

## 2022-10-17 PROCEDURE — 85025 COMPLETE CBC W/AUTO DIFF WBC: CPT

## 2022-10-17 PROCEDURE — 85610 PROTHROMBIN TIME: CPT

## 2022-10-17 PROCEDURE — 99285 EMERGENCY DEPT VISIT HI MDM: CPT | Mod: 25

## 2022-10-17 NOTE — ED PROVIDER NOTE - PROGRESS NOTE DETAILS
facial numbess resolved.  Admission discussed however patient prefers to be discharged to follow up with her PMD.  Pt here with family to accompany patient home.

## 2022-10-17 NOTE — ED PROVIDER NOTE - OBJECTIVE STATEMENT
76 year old female with a PHMx of hypertension, hyperlipidemia and GERD presents to the ED with complaints of right facial numbness since 8:30 PM today. Patient denies any weakness, but is reporting mild headache. As per , she has had similar symptoms before but symptoms has resolved.   Allergies:  Morphine (Pruritus)   Penicillin (Pruritus)

## 2022-10-17 NOTE — ED PROVIDER NOTE - PATIENT PORTAL LINK FT
You can access the FollowMyHealth Patient Portal offered by [facility] by registering at the following website: [email or web address]. By joining [employer]’s FollowMyHealth portal, you will also be able to view your health information using other applications (apps) compatible with our system.

## 2023-08-11 ENCOUNTER — APPOINTMENT (OUTPATIENT)
Dept: ORTHOPEDIC SURGERY | Facility: CLINIC | Age: 78
End: 2023-08-11
Payer: MEDICARE

## 2023-08-11 VITALS — BODY MASS INDEX: 34.78 KG/M2 | HEIGHT: 62 IN | WEIGHT: 189 LBS

## 2023-08-11 PROCEDURE — 99213 OFFICE O/P EST LOW 20 MIN: CPT | Mod: 25

## 2023-08-11 PROCEDURE — 20610 DRAIN/INJ JOINT/BURSA W/O US: CPT | Mod: LT

## 2023-09-08 ENCOUNTER — APPOINTMENT (OUTPATIENT)
Dept: ORTHOPEDIC SURGERY | Facility: CLINIC | Age: 78
End: 2023-09-08
Payer: MEDICARE

## 2023-09-08 ENCOUNTER — OUTPATIENT (OUTPATIENT)
Dept: OUTPATIENT SERVICES | Facility: HOSPITAL | Age: 78
LOS: 1 days | End: 2023-09-08
Payer: MEDICARE

## 2023-09-08 ENCOUNTER — APPOINTMENT (OUTPATIENT)
Dept: CT IMAGING | Facility: CLINIC | Age: 78
End: 2023-09-08
Payer: MEDICARE

## 2023-09-08 VITALS — BODY MASS INDEX: 34.78 KG/M2 | WEIGHT: 189 LBS | HEIGHT: 62 IN

## 2023-09-08 DIAGNOSIS — Z98.891 HISTORY OF UTERINE SCAR FROM PREVIOUS SURGERY: Chronic | ICD-10-CM

## 2023-09-08 DIAGNOSIS — M67.912 UNSPECIFIED DISORDER OF SYNOVIUM AND TENDON, LEFT SHOULDER: ICD-10-CM

## 2023-09-08 DIAGNOSIS — M19.012 PRIMARY OSTEOARTHRITIS, LEFT SHOULDER: ICD-10-CM

## 2023-09-08 DIAGNOSIS — M25.512 PAIN IN LEFT SHOULDER: ICD-10-CM

## 2023-09-08 PROCEDURE — 73200 CT UPPER EXTREMITY W/O DYE: CPT | Mod: 26,LT

## 2023-09-08 PROCEDURE — 73200 CT UPPER EXTREMITY W/O DYE: CPT

## 2023-09-08 PROCEDURE — 99213 OFFICE O/P EST LOW 20 MIN: CPT

## 2023-09-08 RX ORDER — METHYLPREDNISOLONE 4 MG/1
4 TABLET ORAL
Qty: 1 | Refills: 0 | Status: ACTIVE | COMMUNITY
Start: 2023-09-08 | End: 1900-01-01

## 2023-09-14 ENCOUNTER — APPOINTMENT (OUTPATIENT)
Dept: ORTHOPEDIC SURGERY | Facility: CLINIC | Age: 78
End: 2023-09-14
Payer: MEDICARE

## 2023-09-14 VITALS — BODY MASS INDEX: 34.78 KG/M2 | WEIGHT: 189 LBS | HEIGHT: 62 IN

## 2023-09-14 DIAGNOSIS — M25.512 PAIN IN LEFT SHOULDER: ICD-10-CM

## 2023-09-14 DIAGNOSIS — M67.912 UNSPECIFIED DISORDER OF SYNOVIUM AND TENDON, LEFT SHOULDER: ICD-10-CM

## 2023-09-14 DIAGNOSIS — G89.29 PAIN IN LEFT SHOULDER: ICD-10-CM

## 2023-09-14 DIAGNOSIS — M19.012 PRIMARY OSTEOARTHRITIS, LEFT SHOULDER: ICD-10-CM

## 2023-09-14 PROCEDURE — 99214 OFFICE O/P EST MOD 30 MIN: CPT

## 2023-10-03 ENCOUNTER — OUTPATIENT (OUTPATIENT)
Dept: OUTPATIENT SERVICES | Facility: HOSPITAL | Age: 78
LOS: 1 days | End: 2023-10-03

## 2023-10-03 VITALS
OXYGEN SATURATION: 97 % | SYSTOLIC BLOOD PRESSURE: 144 MMHG | RESPIRATION RATE: 18 BRPM | TEMPERATURE: 98 F | WEIGHT: 199.96 LBS | DIASTOLIC BLOOD PRESSURE: 73 MMHG | HEIGHT: 60.5 IN | HEART RATE: 70 BPM

## 2023-10-03 DIAGNOSIS — Z90.49 ACQUIRED ABSENCE OF OTHER SPECIFIED PARTS OF DIGESTIVE TRACT: Chronic | ICD-10-CM

## 2023-10-03 DIAGNOSIS — K40.90 UNILATERAL INGUINAL HERNIA, WITHOUT OBSTRUCTION OR GANGRENE, NOT SPECIFIED AS RECURRENT: Chronic | ICD-10-CM

## 2023-10-03 DIAGNOSIS — I10 ESSENTIAL (PRIMARY) HYPERTENSION: ICD-10-CM

## 2023-10-03 DIAGNOSIS — M19.012 PRIMARY OSTEOARTHRITIS, LEFT SHOULDER: ICD-10-CM

## 2023-10-03 DIAGNOSIS — Z98.891 HISTORY OF UTERINE SCAR FROM PREVIOUS SURGERY: Chronic | ICD-10-CM

## 2023-10-03 DIAGNOSIS — M19.90 UNSPECIFIED OSTEOARTHRITIS, UNSPECIFIED SITE: ICD-10-CM

## 2023-10-03 DIAGNOSIS — Z98.890 OTHER SPECIFIED POSTPROCEDURAL STATES: Chronic | ICD-10-CM

## 2023-10-03 DIAGNOSIS — K21.9 GASTRO-ESOPHAGEAL REFLUX DISEASE WITHOUT ESOPHAGITIS: ICD-10-CM

## 2023-10-03 LAB
BLD GP AB SCN SERPL QL: NEGATIVE — SIGNIFICANT CHANGE UP
RH IG SCN BLD-IMP: POSITIVE — SIGNIFICANT CHANGE UP

## 2023-10-03 RX ORDER — AMLODIPINE BESYLATE 2.5 MG/1
1 TABLET ORAL
Qty: 0 | Refills: 0 | DISCHARGE

## 2023-10-03 RX ORDER — METOPROLOL TARTRATE 50 MG
1 TABLET ORAL
Qty: 0 | Refills: 0 | DISCHARGE

## 2023-10-03 RX ORDER — LOSARTAN POTASSIUM 100 MG/1
1 TABLET, FILM COATED ORAL
Qty: 0 | Refills: 0 | DISCHARGE

## 2023-10-03 RX ORDER — SODIUM CHLORIDE 9 MG/ML
1000 INJECTION, SOLUTION INTRAVENOUS
Refills: 0 | Status: DISCONTINUED | OUTPATIENT
Start: 2023-10-13 | End: 2023-10-14

## 2023-10-03 NOTE — H&P PST ADULT - ASSESSMENT
77 year old pleasant female with history of HTN, HDL, presents with primary OA of left shoulder scheduled for left shoulder reverse total shoulder replacement with Dr. Redman.

## 2023-10-03 NOTE — H&P PST ADULT - PROBLEM SELECTOR PLAN 3
Pt. instructed to continue Carvedilol as prescribed.   Pt. obtained  Cardiac eval as per surgeon request-copy requested by PST NP.

## 2023-10-03 NOTE — H&P PST ADULT - NSICDXPASTMEDICALHX_GEN_ALL_CORE_FT
PAST MEDICAL HISTORY:  GERD (gastroesophageal reflux disease)     HTN (hypertension)     Hypercholesteremia     OA (osteoarthritis)

## 2023-10-03 NOTE — H&P PST ADULT - NSICDXPASTSURGICALHX_GEN_ALL_CORE_FT
PAST SURGICAL HISTORY:  H/O:  section     Hernia, inguinal, right     History of cholecystectomy     S/P appy     S/P excision of lipoma

## 2023-10-03 NOTE — H&P PST ADULT - PROBLEM SELECTOR PLAN 1
scheduled for left shoulder reverse total shoulder replacement with Dr. Redman on 10/13/2023.  Verbal and written pre-op instructions provided to patient. Patient verbalized understanding and will call surgeons office for revised instructions if surgery is rescheduled.   Pepcid for GI prophylaxis provided.   patient given verbal and written instruction with teach back on chlorhexidine shampoo, and the patient verbalized understanding with return demonstration.  Patient will obtain medical clearance as per surgeons request-copy requested for Language line solutions : ID#- high risk surgery

## 2023-10-05 LAB
CULTURE RESULTS: SIGNIFICANT CHANGE UP
SPECIMEN SOURCE: SIGNIFICANT CHANGE UP

## 2023-10-12 ENCOUNTER — TRANSCRIPTION ENCOUNTER (OUTPATIENT)
Age: 78
End: 2023-10-12

## 2023-10-12 NOTE — ASU PATIENT PROFILE, ADULT - FALL HARM RISK - HARM RISK INTERVENTIONS
Assistance with ambulation/Assistance OOB with selected safe patient handling equipment/Communicate Risk of Fall with Harm to all staff/Discuss with provider need for PT consult/Monitor gait and stability/Reinforce activity limits and safety measures with patient and family/Tailored Fall Risk Interventions/Visual Cue: Yellow wristband and red socks/Bed in lowest position, wheels locked, appropriate side rails in place/Call bell, personal items and telephone in reach/Instruct patient to call for assistance before getting out of bed or chair/Non-slip footwear when patient is out of bed/[place] to call system/Physically safe environment - no spills, clutter or unnecessary equipment/Purposeful Proactive Rounding/Room/bathroom lighting operational, light cord in reach

## 2023-10-13 ENCOUNTER — APPOINTMENT (OUTPATIENT)
Dept: ORTHOPEDIC SURGERY | Facility: HOSPITAL | Age: 78
End: 2023-10-13

## 2023-10-13 ENCOUNTER — RESULT REVIEW (OUTPATIENT)
Age: 78
End: 2023-10-13

## 2023-10-13 ENCOUNTER — TRANSCRIPTION ENCOUNTER (OUTPATIENT)
Age: 78
End: 2023-10-13

## 2023-10-13 ENCOUNTER — INPATIENT (INPATIENT)
Facility: HOSPITAL | Age: 78
LOS: 0 days | Discharge: HOME | End: 2023-10-14
Attending: ORTHOPAEDIC SURGERY | Admitting: ORTHOPAEDIC SURGERY
Payer: MEDICARE

## 2023-10-13 VITALS
HEIGHT: 60.5 IN | HEART RATE: 76 BPM | TEMPERATURE: 97 F | WEIGHT: 199.96 LBS | SYSTOLIC BLOOD PRESSURE: 136 MMHG | DIASTOLIC BLOOD PRESSURE: 53 MMHG | OXYGEN SATURATION: 99 % | RESPIRATION RATE: 16 BRPM

## 2023-10-13 DIAGNOSIS — Z98.891 HISTORY OF UTERINE SCAR FROM PREVIOUS SURGERY: Chronic | ICD-10-CM

## 2023-10-13 DIAGNOSIS — M19.012 PRIMARY OSTEOARTHRITIS, LEFT SHOULDER: ICD-10-CM

## 2023-10-13 DIAGNOSIS — K40.90 UNILATERAL INGUINAL HERNIA, WITHOUT OBSTRUCTION OR GANGRENE, NOT SPECIFIED AS RECURRENT: Chronic | ICD-10-CM

## 2023-10-13 DIAGNOSIS — Z90.49 ACQUIRED ABSENCE OF OTHER SPECIFIED PARTS OF DIGESTIVE TRACT: Chronic | ICD-10-CM

## 2023-10-13 DIAGNOSIS — Z98.890 OTHER SPECIFIED POSTPROCEDURAL STATES: Chronic | ICD-10-CM

## 2023-10-13 LAB
ANION GAP SERPL CALC-SCNC: 13 MMOL/L — SIGNIFICANT CHANGE UP (ref 7–14)
BUN SERPL-MCNC: 16 MG/DL — SIGNIFICANT CHANGE UP (ref 7–23)
CALCIUM SERPL-MCNC: 9.4 MG/DL — SIGNIFICANT CHANGE UP (ref 8.4–10.5)
CHLORIDE SERPL-SCNC: 105 MMOL/L — SIGNIFICANT CHANGE UP (ref 98–107)
CO2 SERPL-SCNC: 23 MMOL/L — SIGNIFICANT CHANGE UP (ref 22–31)
CREAT SERPL-MCNC: 0.69 MG/DL — SIGNIFICANT CHANGE UP (ref 0.5–1.3)
EGFR: 90 ML/MIN/1.73M2 — SIGNIFICANT CHANGE UP
GLUCOSE SERPL-MCNC: 121 MG/DL — HIGH (ref 70–99)
HCT VFR BLD CALC: 37.5 % — SIGNIFICANT CHANGE UP (ref 34.5–45)
HGB BLD-MCNC: 12.3 G/DL — SIGNIFICANT CHANGE UP (ref 11.5–15.5)
MCHC RBC-ENTMCNC: 28.8 PG — SIGNIFICANT CHANGE UP (ref 27–34)
MCHC RBC-ENTMCNC: 32.8 GM/DL — SIGNIFICANT CHANGE UP (ref 32–36)
MCV RBC AUTO: 87.8 FL — SIGNIFICANT CHANGE UP (ref 80–100)
NRBC # BLD: 0 /100 WBCS — SIGNIFICANT CHANGE UP (ref 0–0)
NRBC # FLD: 0 K/UL — SIGNIFICANT CHANGE UP (ref 0–0)
PLATELET # BLD AUTO: 265 K/UL — SIGNIFICANT CHANGE UP (ref 150–400)
POTASSIUM SERPL-MCNC: 4.1 MMOL/L — SIGNIFICANT CHANGE UP (ref 3.5–5.3)
POTASSIUM SERPL-SCNC: 4.1 MMOL/L — SIGNIFICANT CHANGE UP (ref 3.5–5.3)
RBC # BLD: 4.27 M/UL — SIGNIFICANT CHANGE UP (ref 3.8–5.2)
RBC # FLD: 14.9 % — HIGH (ref 10.3–14.5)
RH IG SCN BLD-IMP: POSITIVE — SIGNIFICANT CHANGE UP
SODIUM SERPL-SCNC: 141 MMOL/L — SIGNIFICANT CHANGE UP (ref 135–145)
WBC # BLD: 12.02 K/UL — HIGH (ref 3.8–10.5)
WBC # FLD AUTO: 12.02 K/UL — HIGH (ref 3.8–10.5)

## 2023-10-13 PROCEDURE — 73030 X-RAY EXAM OF SHOULDER: CPT | Mod: 26,LT

## 2023-10-13 DEVICE — GLENOSPHERE 36MM DIA OF CURVE: Type: IMPLANTABLE DEVICE | Site: LEFT | Status: FUNCTIONAL

## 2023-10-13 DEVICE — TRAY HUMERAL PLUS 6 STD: Type: IMPLANTABLE DEVICE | Site: LEFT | Status: FUNCTIONAL

## 2023-10-13 DEVICE — STEM HUM MICRO LT 11X55MM: Type: IMPLANTABLE DEVICE | Site: LEFT | Status: FUNCTIONAL

## 2023-10-13 DEVICE — SCREW COMP LOCKING 3.5 HEX 4.75X15MM: Type: IMPLANTABLE DEVICE | Site: LEFT | Status: FUNCTIONAL

## 2023-10-13 DEVICE — BEARING HUMERAL 36MM STD: Type: IMPLANTABLE DEVICE | Site: LEFT | Status: FUNCTIONAL

## 2023-10-13 DEVICE — SCREW RVS CNTRL 6.5X25MM ST: Type: IMPLANTABLE DEVICE | Site: LEFT | Status: FUNCTIONAL

## 2023-10-13 DEVICE — BASEPLATE GLENOID MINI REV 25MM: Type: IMPLANTABLE DEVICE | Site: LEFT | Status: FUNCTIONAL

## 2023-10-13 DEVICE — IMPLANTABLE DEVICE: Type: IMPLANTABLE DEVICE | Site: LEFT | Status: FUNCTIONAL

## 2023-10-13 DEVICE — PIN REVERSE SHOULDER: Type: IMPLANTABLE DEVICE | Site: LEFT | Status: FUNCTIONAL

## 2023-10-13 RX ORDER — FLUTICASONE PROPIONATE 220 MCG
1 AEROSOL WITH ADAPTER (GRAM) INHALATION
Refills: 0 | DISCHARGE

## 2023-10-13 RX ORDER — ONDANSETRON 8 MG/1
4 TABLET, FILM COATED ORAL ONCE
Refills: 0 | Status: DISCONTINUED | OUTPATIENT
Start: 2023-10-13 | End: 2023-10-13

## 2023-10-13 RX ORDER — FENTANYL CITRATE 50 UG/ML
25 INJECTION INTRAVENOUS
Refills: 0 | Status: DISCONTINUED | OUTPATIENT
Start: 2023-10-13 | End: 2023-10-13

## 2023-10-13 RX ORDER — ROSUVASTATIN CALCIUM 5 MG/1
1 TABLET ORAL
Qty: 0 | Refills: 0 | DISCHARGE

## 2023-10-13 RX ORDER — OXYCODONE HYDROCHLORIDE 5 MG/1
5 TABLET ORAL ONCE
Refills: 0 | Status: DISCONTINUED | OUTPATIENT
Start: 2023-10-13 | End: 2023-10-14

## 2023-10-13 RX ORDER — HYDROMORPHONE HYDROCHLORIDE 2 MG/ML
0.25 INJECTION INTRAMUSCULAR; INTRAVENOUS; SUBCUTANEOUS
Refills: 0 | Status: DISCONTINUED | OUTPATIENT
Start: 2023-10-13 | End: 2023-10-13

## 2023-10-13 RX ORDER — OXYCODONE HYDROCHLORIDE 5 MG/1
10 TABLET ORAL EVERY 4 HOURS
Refills: 0 | Status: DISCONTINUED | OUTPATIENT
Start: 2023-10-13 | End: 2023-10-14

## 2023-10-13 RX ORDER — OXYCODONE HYDROCHLORIDE 5 MG/1
5 TABLET ORAL EVERY 4 HOURS
Refills: 0 | Status: DISCONTINUED | OUTPATIENT
Start: 2023-10-13 | End: 2023-10-14

## 2023-10-13 RX ORDER — POLYETHYLENE GLYCOL 3350 17 G/17G
17 POWDER, FOR SOLUTION ORAL AT BEDTIME
Refills: 0 | Status: DISCONTINUED | OUTPATIENT
Start: 2023-10-13 | End: 2023-10-14

## 2023-10-13 RX ORDER — CARVEDILOL PHOSPHATE 80 MG/1
1 CAPSULE, EXTENDED RELEASE ORAL
Refills: 0 | DISCHARGE

## 2023-10-13 RX ORDER — ACETAMINOPHEN 500 MG
975 TABLET ORAL EVERY 8 HOURS
Refills: 0 | Status: DISCONTINUED | OUTPATIENT
Start: 2023-10-13 | End: 2023-10-14

## 2023-10-13 RX ORDER — ASPIRIN/CALCIUM CARB/MAGNESIUM 324 MG
325 TABLET ORAL DAILY
Refills: 0 | Status: DISCONTINUED | OUTPATIENT
Start: 2023-10-14 | End: 2023-10-14

## 2023-10-13 RX ORDER — LOSARTAN POTASSIUM 100 MG/1
100 TABLET, FILM COATED ORAL DAILY
Refills: 0 | Status: DISCONTINUED | OUTPATIENT
Start: 2023-10-13 | End: 2023-10-14

## 2023-10-13 RX ORDER — SENNA PLUS 8.6 MG/1
2 TABLET ORAL AT BEDTIME
Refills: 0 | Status: DISCONTINUED | OUTPATIENT
Start: 2023-10-13 | End: 2023-10-14

## 2023-10-13 RX ORDER — DIPHENHYDRAMINE HCL 50 MG
25 CAPSULE ORAL ONCE
Refills: 0 | Status: DISCONTINUED | OUTPATIENT
Start: 2023-10-13 | End: 2023-10-14

## 2023-10-13 RX ORDER — LOSARTAN POTASSIUM 100 MG/1
1 TABLET, FILM COATED ORAL
Refills: 0 | DISCHARGE

## 2023-10-13 RX ORDER — SODIUM CHLORIDE 9 MG/ML
1000 INJECTION INTRAMUSCULAR; INTRAVENOUS; SUBCUTANEOUS
Refills: 0 | Status: DISCONTINUED | OUTPATIENT
Start: 2023-10-13 | End: 2023-10-14

## 2023-10-13 RX ORDER — PANTOPRAZOLE SODIUM 20 MG/1
1 TABLET, DELAYED RELEASE ORAL
Qty: 0 | Refills: 0 | DISCHARGE

## 2023-10-13 RX ORDER — CARVEDILOL PHOSPHATE 80 MG/1
12.5 CAPSULE, EXTENDED RELEASE ORAL EVERY 12 HOURS
Refills: 0 | Status: DISCONTINUED | OUTPATIENT
Start: 2023-10-13 | End: 2023-10-14

## 2023-10-13 RX ORDER — CEFAZOLIN SODIUM 1 G
2000 VIAL (EA) INJECTION EVERY 8 HOURS
Refills: 0 | Status: COMPLETED | OUTPATIENT
Start: 2023-10-13 | End: 2023-10-14

## 2023-10-13 RX ORDER — ONDANSETRON 8 MG/1
4 TABLET, FILM COATED ORAL EVERY 6 HOURS
Refills: 0 | Status: DISCONTINUED | OUTPATIENT
Start: 2023-10-13 | End: 2023-10-14

## 2023-10-13 RX ORDER — PANTOPRAZOLE SODIUM 20 MG/1
40 TABLET, DELAYED RELEASE ORAL
Refills: 0 | Status: DISCONTINUED | OUTPATIENT
Start: 2023-10-13 | End: 2023-10-14

## 2023-10-13 RX ORDER — ATORVASTATIN CALCIUM 80 MG/1
80 TABLET, FILM COATED ORAL AT BEDTIME
Refills: 0 | Status: DISCONTINUED | OUTPATIENT
Start: 2023-10-13 | End: 2023-10-14

## 2023-10-13 RX ADMIN — Medication 100 MILLIGRAM(S): at 21:52

## 2023-10-13 RX ADMIN — OXYCODONE HYDROCHLORIDE 5 MILLIGRAM(S): 5 TABLET ORAL at 19:48

## 2023-10-13 RX ADMIN — Medication 975 MILLIGRAM(S): at 21:56

## 2023-10-13 RX ADMIN — FENTANYL CITRATE 25 MICROGRAM(S): 50 INJECTION INTRAVENOUS at 16:30

## 2023-10-13 RX ADMIN — OXYCODONE HYDROCHLORIDE 5 MILLIGRAM(S): 5 TABLET ORAL at 20:48

## 2023-10-13 RX ADMIN — CARVEDILOL PHOSPHATE 12.5 MILLIGRAM(S): 80 CAPSULE, EXTENDED RELEASE ORAL at 23:08

## 2023-10-13 RX ADMIN — FENTANYL CITRATE 25 MICROGRAM(S): 50 INJECTION INTRAVENOUS at 16:11

## 2023-10-13 RX ADMIN — LOSARTAN POTASSIUM 100 MILLIGRAM(S): 100 TABLET, FILM COATED ORAL at 23:08

## 2023-10-13 RX ADMIN — Medication 975 MILLIGRAM(S): at 22:56

## 2023-10-13 NOTE — DISCHARGE NOTE PROVIDER - HOSPITAL COURSE
This is a 76yo Female with PMH of HTN and HLD who presents to [facility] for orthopedic surgery. Patient is s/p L reverse total shoulder arthroplasty on 10/13/2023 with Dr. Redman without any intraoperative complications. Patient is non-weight bearing LEFT upper extremity with sling ON at all times no external rotation, no AROM, no PROM of shoulder, active range of motion of elbow/wrist/fingers encouraged. Patient is doing well and stable for discharge.  Any suture/staples to be removed on post-op day #14 at your office visit. Patient on 325mg of ASA for 6 weeks for DVT prophylaxis. Follow up with Dr. Redman in office in 2 weeks. Please follow up with your PMD for continuity of care and management as medications may have changed.

## 2023-10-13 NOTE — DISCHARGE NOTE PROVIDER - NSDCMRMEDTOKEN_GEN_ALL_CORE_FT
carvedilol 12.5 mg oral tablet: 1 tab(s) orally 2 times a day  fluticasone 50 mcg/inh inhalation powder: 1 puff(s) inhaled once a day (at bedtime) as needed for  bronchospasm  losartan 100 mg oral tablet: 1 tab(s) orally once a day PM  Low Dose ASA 81 mg oral tablet: orally once a day  naproxen 500 mg (as sodium) oral tablet, extended release: 1 tab(s) orally  pantoprazole 20 mg oral delayed release tablet: 1 tab(s) orally once a day  rosuvastatin 20 mg oral tablet: 1 tab(s) orally once a day   acetaminophen 325 mg oral tablet: 3 tab(s) orally every 8 hours  aspirin 325 mg oral tablet: 1 tab(s) orally once a day  atorvastatin 80 mg oral tablet: 1 tab(s) orally once a day (at bedtime)  carvedilol 12.5 mg oral tablet: 1 tab(s) orally 2 times a day  diphenhydrAMINE 25 mg oral capsule: 1 cap(s) orally once As needed Rash and/or Itching  fluticasone 50 mcg/inh inhalation powder: 1 puff(s) inhaled once a day (at bedtime) as needed for  bronchospasm  losartan 100 mg oral tablet: 1 tab(s) orally once a day PM  naproxen 500 mg (as sodium) oral tablet, extended release: 1 tab(s) orally  pantoprazole 20 mg oral delayed release tablet: 1 tab(s) orally once a day  polyethylene glycol 3350 oral powder for reconstitution: 17 gram(s) orally once a day (at bedtime)  rosuvastatin 20 mg oral tablet: 1 tab(s) orally once a day  senna leaf extract oral tablet: 2 tab(s) orally once a day (at bedtime)   acetaminophen 325 mg oral tablet: 1 tab(s) orally once a day  aspirin 325 mg oral tablet: 1 tab(s) orally once a day  atorvastatin 80 mg oral tablet: 1 tab(s) orally once a day (at bedtime)  carvedilol 12.5 mg oral tablet: 1 tab(s) orally 2 times a day  diphenhydrAMINE 25 mg oral capsule: 1 cap(s) orally once As needed Rash and/or Itching  fluticasone 50 mcg/inh inhalation powder: 1 puff(s) inhaled once a day (at bedtime) as needed for  bronchospasm  losartan 100 mg oral tablet: 1 tab(s) orally once a day PM  naproxen 500 mg (as sodium) oral tablet, extended release: 1 tab(s) orally  oxyCODONE 5 mg oral tablet: 1 tab(s) orally every 6 hours as needed for Severe Pain (7 - 10) MDD: 4 tabs  pantoprazole 20 mg oral delayed release tablet: 1 tab(s) orally once a day  polyethylene glycol 3350 oral powder for reconstitution: 17 gram(s) orally once a day (at bedtime)  rosuvastatin 20 mg oral tablet: 1 tab(s) orally once a day  senna leaf extract oral tablet: 2 tab(s) orally once a day (at bedtime)   acetaminophen 325 mg oral tablet: 1 tab(s) orally every 6 hours as needed for  moderate pain MDD: 4 tabs  aspirin 325 mg oral tablet: 1 tab(s) orally once a day  atorvastatin 80 mg oral tablet: 1 tab(s) orally once a day (at bedtime)  carvedilol 12.5 mg oral tablet: 1 tab(s) orally 2 times a day  diphenhydrAMINE 25 mg oral capsule: 1 cap(s) orally once As needed Rash and/or Itching  fluticasone 50 mcg/inh inhalation powder: 1 puff(s) inhaled once a day (at bedtime) as needed for  bronchospasm  losartan 100 mg oral tablet: 1 tab(s) orally once a day PM  naproxen 500 mg (as sodium) oral tablet, extended release: 1 tab(s) orally  oxyCODONE 5 mg oral tablet: 1 tab(s) orally every 6 hours as needed for Severe Pain (7 - 10) MDD: 4 tabs  pantoprazole 20 mg oral delayed release tablet: 1 tab(s) orally once a day  polyethylene glycol 3350 oral powder for reconstitution: 17 gram(s) orally once a day (at bedtime)  rosuvastatin 20 mg oral tablet: 1 tab(s) orally once a day  senna leaf extract oral tablet: 2 tab(s) orally once a day (at bedtime)

## 2023-10-13 NOTE — DISCHARGE NOTE NURSING/CASE MANAGEMENT/SOCIAL WORK - PATIENT PORTAL LINK FT
You can access the FollowMyHealth Patient Portal offered by [facility] by registering at the following website: [email or web address]. By joining [employer]’s FollowMyHealth portal, you will also be able to view your health information using other applications (apps) compatible with our system.

## 2023-10-13 NOTE — DISCHARGE NOTE PROVIDER - NSDCCPCAREPLAN_GEN_ALL_CORE_FT
PRINCIPAL DISCHARGE DIAGNOSIS  Diagnosis: Left rotator cuff tear arthropathy  Assessment and Plan of Treatment: Diet: Continue regular diet upon discharge.   Activity: No heavy lifting. DO NOT carry anything heavier than a pencil in your hand. Avoid straining or excessive activity x 6 weeks.   Non-weight bearing in LEFT upper extremity with sling ON at all times (includes sleeping).   No external rotation, no active range of motion, no passive range of motion of shoulder. Active range of motion of elbow/wrist/fingers encouraged.   Dressings: Keep dressing clean, dry, and intact. Remove all cotton and yellow gauze 72 hours after surgery. You do not need to put a new dressing on your wound unless there is light drainage. If that occurs place Band-Aids on your wound.   Other Care:  -You may shower 72 hours after surgery but DO NOT soak dressing and/or incision. The water may run over your dressing/incision but DO NOT let the water directly hit your dressing/incision (take a shower with your wound away from the direct stream of water).  NO hot tubes, NO bath tubs, NO swimming pools.   -You may ice the shoulder to reduce welling and discomfort. DO NOT ice longer than 20 min at a time. Avoid getting your wound wet.   -You may find it more comfortable to sleep in a reclining position. You can either sleep in a recliner chair or create this position with pillows.   .

## 2023-10-13 NOTE — DISCHARGE NOTE PROVIDER - NSDCFUSCHEDAPPT_GEN_ALL_CORE_FT
Wiilan Redman  [facility]  ORTHOSURG [address]  Scheduled Appointment: 10/27/2023

## 2023-10-13 NOTE — DISCHARGE NOTE PROVIDER - NSDCCPTREATMENT_GEN_ALL_CORE_FT
PRINCIPAL PROCEDURE  Procedure: Reverse total shoulder replacement  Findings and Treatment: Pain control:    Standing:         -Acetaminophen 500mg - 2 tabs every 8 hours  As needed:        -Tramadol 50mg - 1 tab every 6 hours - Take only if needed for MODERATE pain       -oxycodone 5mg - 1 tab every 4-6 hours - Take only if needed for SEVERE or BREAKTHROUGH pain  Oxycodone and Tramadol have been sent to your pharmacy. Please do not drive, operate machinery, or make important decisions while taking these medicationd.   Other Medications: (Standing)  -Aspirin (Enteric Coated) 325mg every 24hours - to prevent blood clots (for 6 weeks post operatively.)  -Protonix 40mg - 1 tab every 24 hours - to prevent stomach irritation/ulcers  -Senna 8.6mg - 2 pills every 24 hours - stool softener  -Miralax 17g - daily - constipation   Follow up: Please follow up at your prescheduled post-operative follow up appointment with Dr. Redman for 2 weeks after hospital discharge. At this visit your stitches will be removed. Bruising and/or swelling of the shoulder, arm, or hand are common after surgery. Please call with any questions or concerns including fevers/chills, worsening pain, pus from the wounds, redness of the skin or difficulty breathing or heaviness in the chest at [phone].     PRINCIPAL PROCEDURE  Procedure: Reverse total shoulder replacement  Findings and Treatment: Pain control:    Standing:         -Acetaminophen 500mg - 2 tabs every 8 hours  As needed:        -Tramadol 50mg - 1 tab every 6 hours - Take only if needed for MODERATE pain       -oxycodone 5mg - 1 tab every 4-6 hours - Take only if needed for SEVERE or BREAKTHROUGH pain  Oxycodone and Tramadol have been sent to your pharmacy. Please do not drive, operate machinery, or make important decisions while taking these medicationd.   Other Medications: (Standing)  -Aspirin (Enteric Coated) 325mg every 24hours - to prevent blood clots (for 4 weeks post operatively.)  -Protonix 40mg - 1 tab every 24 hours - to prevent stomach irritation/ulcers  -Senna 8.6mg - 2 pills every 24 hours - stool softener  -Miralax 17g - daily - constipation   Follow up: Please follow up at your prescheduled post-operative follow up appointment with Dr. Redman for 2 weeks after hospital discharge. At this visit your stitches will be removed. Bruising and/or swelling of the shoulder, arm, or hand are common after surgery. Please call with any questions or concerns including fevers/chills, worsening pain, pus from the wounds, redness of the skin or difficulty breathing or heaviness in the chest at [phone].

## 2023-10-13 NOTE — DISCHARGE NOTE PROVIDER - CARE PROVIDER_API CALL
Wilian Redman  Orthopaedic Surgery  [address], [address]  [place] [address]  Phone: [phone]  Fax: [phone]  Established Patient  Follow Up Time:

## 2023-10-13 NOTE — DISCHARGE NOTE NURSING/CASE MANAGEMENT/SOCIAL WORK - NSDCPNINST_GEN_ALL_CORE
You have a post op appointment with Dr. Redman on October 27, 2023 @11:45am. If you are unable to keep this appointment, please call the office to reschedule. Call MD if you develop a fever, or if there is redness, swelling, drainage or pain not relieved by pain medication. No heavy lifting, bending, or straining to move your bowels. Take over the counter stool softeners as needed to prevent constipation which may be caused by pain medication.

## 2023-10-13 NOTE — PATIENT PROFILE ADULT - FALL HARM RISK - HARM RISK INTERVENTIONS
Assistance with ambulation/Assistance OOB with selected safe patient handling equipment/Communicate Risk of Fall with Harm to all staff/Discuss with provider need for PT consult/Monitor gait and stability/Reinforce activity limits and safety measures with patient and family/Sit up slowly, dangle for a short time, stand at bedside before walking/Tailored Fall Risk Interventions/Use of alarms - bed, chair and/or voice tab/Visual Cue: Yellow wristband and red socks/Bed in lowest position, wheels locked, appropriate side rails in place/Call bell, personal items and telephone in reach/Instruct patient to call for assistance before getting out of bed or chair/Non-slip footwear when patient is out of bed/[place] to call system/Physically safe environment - no spills, clutter or unnecessary equipment/Purposeful Proactive Rounding/Room/bathroom lighting operational, light cord in reach

## 2023-10-13 NOTE — ASU PREOP CHECKLIST - 1.
warm blanket warm blanket ,  service used at 10:51am ID # [id number] patient requested to have daughter Vidya translate medical information

## 2023-10-14 VITALS
SYSTOLIC BLOOD PRESSURE: 136 MMHG | DIASTOLIC BLOOD PRESSURE: 59 MMHG | TEMPERATURE: 98 F | RESPIRATION RATE: 17 BRPM | HEART RATE: 77 BPM | OXYGEN SATURATION: 99 %

## 2023-10-14 LAB
ANION GAP SERPL CALC-SCNC: 12 MMOL/L — SIGNIFICANT CHANGE UP (ref 7–14)
BUN SERPL-MCNC: 14 MG/DL — SIGNIFICANT CHANGE UP (ref 7–23)
CALCIUM SERPL-MCNC: 8.8 MG/DL — SIGNIFICANT CHANGE UP (ref 8.4–10.5)
CHLORIDE SERPL-SCNC: 103 MMOL/L — SIGNIFICANT CHANGE UP (ref 98–107)
CO2 SERPL-SCNC: 22 MMOL/L — SIGNIFICANT CHANGE UP (ref 22–31)
CREAT SERPL-MCNC: 0.6 MG/DL — SIGNIFICANT CHANGE UP (ref 0.5–1.3)
EGFR: 93 ML/MIN/1.73M2 — SIGNIFICANT CHANGE UP
GLUCOSE SERPL-MCNC: 140 MG/DL — HIGH (ref 70–99)
HCT VFR BLD CALC: 33.8 % — LOW (ref 34.5–45)
HGB BLD-MCNC: 11.2 G/DL — LOW (ref 11.5–15.5)
MCHC RBC-ENTMCNC: 28.9 PG — SIGNIFICANT CHANGE UP (ref 27–34)
MCHC RBC-ENTMCNC: 33.1 GM/DL — SIGNIFICANT CHANGE UP (ref 32–36)
MCV RBC AUTO: 87.3 FL — SIGNIFICANT CHANGE UP (ref 80–100)
NRBC # BLD: 0 /100 WBCS — SIGNIFICANT CHANGE UP (ref 0–0)
NRBC # FLD: 0 K/UL — SIGNIFICANT CHANGE UP (ref 0–0)
PLATELET # BLD AUTO: 252 K/UL — SIGNIFICANT CHANGE UP (ref 150–400)
POTASSIUM SERPL-MCNC: 3.9 MMOL/L — SIGNIFICANT CHANGE UP (ref 3.5–5.3)
POTASSIUM SERPL-SCNC: 3.9 MMOL/L — SIGNIFICANT CHANGE UP (ref 3.5–5.3)
RBC # BLD: 3.87 M/UL — SIGNIFICANT CHANGE UP (ref 3.8–5.2)
RBC # FLD: 14.9 % — HIGH (ref 10.3–14.5)
SODIUM SERPL-SCNC: 137 MMOL/L — SIGNIFICANT CHANGE UP (ref 135–145)
WBC # BLD: 11.64 K/UL — HIGH (ref 3.8–10.5)
WBC # FLD AUTO: 11.64 K/UL — HIGH (ref 3.8–10.5)

## 2023-10-14 RX ORDER — ACETAMINOPHEN 500 MG
1 TABLET ORAL
Qty: 42 | Refills: 0
Start: 2023-10-14 | End: 2023-11-24

## 2023-10-14 RX ORDER — BENZOCAINE AND MENTHOL 5; 1 G/100ML; G/100ML
1 LIQUID ORAL ONCE
Refills: 0 | Status: COMPLETED | OUTPATIENT
Start: 2023-10-14 | End: 2023-10-14

## 2023-10-14 RX ORDER — ACETAMINOPHEN 500 MG
1 TABLET ORAL
Qty: 56 | Refills: 0
Start: 2023-10-14 | End: 2023-10-27

## 2023-10-14 RX ORDER — SENNA PLUS 8.6 MG/1
2 TABLET ORAL
Qty: 0 | Refills: 0 | DISCHARGE
Start: 2023-10-14

## 2023-10-14 RX ORDER — DIPHENHYDRAMINE HCL 50 MG
1 CAPSULE ORAL
Qty: 0 | Refills: 0 | DISCHARGE
Start: 2023-10-14

## 2023-10-14 RX ORDER — ASPIRIN/CALCIUM CARB/MAGNESIUM 324 MG
1 TABLET ORAL
Qty: 0 | Refills: 0 | DISCHARGE
Start: 2023-10-14

## 2023-10-14 RX ORDER — ASPIRIN/CALCIUM CARB/MAGNESIUM 324 MG
0 TABLET ORAL
Qty: 0 | Refills: 0 | DISCHARGE

## 2023-10-14 RX ORDER — ATORVASTATIN CALCIUM 80 MG/1
1 TABLET, FILM COATED ORAL
Qty: 0 | Refills: 0 | DISCHARGE
Start: 2023-10-14

## 2023-10-14 RX ORDER — ASPIRIN/CALCIUM CARB/MAGNESIUM 324 MG
1 TABLET ORAL
Qty: 28 | Refills: 0
Start: 2023-10-14 | End: 2023-11-10

## 2023-10-14 RX ORDER — POLYETHYLENE GLYCOL 3350 17 G/17G
17 POWDER, FOR SOLUTION ORAL
Qty: 0 | Refills: 0 | DISCHARGE
Start: 2023-10-14

## 2023-10-14 RX ORDER — OXYCODONE HYDROCHLORIDE 5 MG/1
1 TABLET ORAL
Qty: 20 | Refills: 0
Start: 2023-10-14 | End: 2023-10-18

## 2023-10-14 RX ORDER — ACETAMINOPHEN 500 MG
3 TABLET ORAL
Qty: 0 | Refills: 0 | DISCHARGE
Start: 2023-10-14

## 2023-10-14 RX ADMIN — CARVEDILOL PHOSPHATE 12.5 MILLIGRAM(S): 80 CAPSULE, EXTENDED RELEASE ORAL at 05:28

## 2023-10-14 RX ADMIN — OXYCODONE HYDROCHLORIDE 10 MILLIGRAM(S): 5 TABLET ORAL at 02:30

## 2023-10-14 RX ADMIN — Medication 975 MILLIGRAM(S): at 06:28

## 2023-10-14 RX ADMIN — OXYCODONE HYDROCHLORIDE 10 MILLIGRAM(S): 5 TABLET ORAL at 01:30

## 2023-10-14 RX ADMIN — Medication 975 MILLIGRAM(S): at 05:28

## 2023-10-14 RX ADMIN — Medication 325 MILLIGRAM(S): at 12:14

## 2023-10-14 RX ADMIN — OXYCODONE HYDROCHLORIDE 10 MILLIGRAM(S): 5 TABLET ORAL at 09:45

## 2023-10-14 RX ADMIN — Medication 975 MILLIGRAM(S): at 13:58

## 2023-10-14 RX ADMIN — BENZOCAINE AND MENTHOL 1 LOZENGE: 5; 1 LIQUID ORAL at 05:59

## 2023-10-14 RX ADMIN — PANTOPRAZOLE SODIUM 40 MILLIGRAM(S): 20 TABLET, DELAYED RELEASE ORAL at 05:30

## 2023-10-14 RX ADMIN — OXYCODONE HYDROCHLORIDE 10 MILLIGRAM(S): 5 TABLET ORAL at 09:01

## 2023-10-14 RX ADMIN — LOSARTAN POTASSIUM 100 MILLIGRAM(S): 100 TABLET, FILM COATED ORAL at 05:28

## 2023-10-14 RX ADMIN — Medication 100 MILLIGRAM(S): at 05:26

## 2023-10-14 NOTE — PHYSICAL THERAPY INITIAL EVALUATION ADULT - ADDITIONAL COMMENTS
Pt states sh lives in a house with her spouse and has 2 steps to enter and stays on the main level. Pt has a stair lift that goes downstairs. Prior to admission, pt was ambulating independently. Pt states  helps as needed.  Post PT evaluation, pt left semi-supine, alarm on, call bell and remote within reach, all precautions maintained, NAD. RN aware.

## 2023-10-14 NOTE — OCCUPATIONAL THERAPY INITIAL EVALUATION ADULT - LIVES WITH, PROFILE
Patient lives in a house with her spouse with 4 steps to enter, chair lift inside down to kitchen area.

## 2023-10-14 NOTE — PHYSICAL THERAPY INITIAL EVALUATION ADULT - RANGE OF MOTION EXAMINATION, REHAB EVAL
left UE in sling/Right UE ROM was WFL (within functional limits)/bilateral lower extremity ROM was WFL (within functional limits)

## 2023-10-14 NOTE — OCCUPATIONAL THERAPY INITIAL EVALUATION ADULT - GENERAL OBSERVATIONS, REHAB EVAL
Patient received seated in beside chair in NAD; agreeable to participate in OT evaluation. +IV. +left UE in sling. BP: 125/43 mmHg. HR 77 bpm.

## 2023-10-14 NOTE — OCCUPATIONAL THERAPY INITIAL EVALUATION ADULT - RANGE OF MOTION EXAMINATION, UPPER EXTREMITY
Left shoulder not assessed due to surgical precautions, elbow/wrist/hand WFL/Right UE Active ROM was WFL (within functional limits)

## 2023-10-14 NOTE — OCCUPATIONAL THERAPY INITIAL EVALUATION ADULT - NSOTDISCHREC_GEN_A_CORE
Home with outpatient OT once cleared by MD. Patient demonstrates understanding of basic ADL and functional transfer skills. Patient is functionally cleared for discharge from OT perspective. OT to continue to follow while inpatient.

## 2023-10-14 NOTE — PROGRESS NOTE ADULT - ASSESSMENT
A/P: 77y y/o Female s/p L reverse total shoulder arthroplasty, POD #0   -Pain control/analgesia  -DVT ppx - Venodynes/Aspirin 325mg  -FU AM Labs  -Non-weight bearing left upper extremity in shoulder immobilizer/sling  -Keep in sling at all times  -NO external rotation   -NO AAROM  -NO PROM   -Notify orthopedics with any questions
A/P: 77y y/o Female s/p L reverse total shoulder arthroplasty    -Pain control/analgesia  -DVT ppx - Venodynes/Aspirin 325mg  -FU AM Labs  -Non-weight bearing left upper extremity in shoulder immobilizer/sling  -Keep in sling at all times  -NO external rotation   -NO AAROM  -NO PROM     For all questions related to patient care, please reach out to the on-call team via the pager.     Marleni Solre, PGY 2  Orthopaedic Surgery  [facility] [id number]  [facility] [place] [id number]  [facility] [phone]

## 2023-10-14 NOTE — PROGRESS NOTE ADULT - ATTENDING COMMENTS
POD1  Sitting in chair at bedside.  Afeb, vss  Left shoulder bandage clean and dry.  Left interscalene block wearing off.  Still some numbness of thumb, but good AROM left wrist and hand.  Stable  For d/c home today.  Reviewed use of pain meds with patient.  Followup in office 7-10 days.

## 2023-10-14 NOTE — PROGRESS NOTE ADULT - SUBJECTIVE AND OBJECTIVE BOX
ORTHOPEDIC PROGRESS NOTE    Overnight events: None    SUBJECTIVE: Pt seen and examined at bedside. Patient is doing well, no acute complaints this AM. Pain is controlled with medication      OBJECTIVE:  Vital Signs Last 24 Hrs  T(C): 36.6 (14 Oct 2023 05:22), Max: 37.2 (13 Oct 2023 21:44)  T(F): 97.9 (14 Oct 2023 05:22), Max: 98.9 (13 Oct 2023 21:44)  HR: 75 (14 Oct 2023 05:22) (73 - 86)  BP: 124/45 (14 Oct 2023 05:22) (97/54 - 151/80)  BP(mean): 74 (13 Oct 2023 18:00) (64 - 108)  RR: 17 (14 Oct 2023 05:22) (14 - 23)  SpO2: 98% (14 Oct 2023 05:22) (94% - 100%)    Parameters below as of 14 Oct 2023 05:22  Patient On (Oxygen Delivery Method): room air          10-13-23 @ 07:01  -  10-14-23 @ 06:37  --------------------------------------------------------  IN: 1590 mL / OUT: 900 mL / NET: 690 mL        Physical Examination:  GEN: NAD, resting quietly  PULM: symmetric chest rise bilaterally, no increased WOB  ABD: nondistended  EXTR:   LUE:    Dressing clean/dry/intact  Median/ulnar/radial function intact motor and sensory  radial pulse 2+   Extremity warm  Capillary refill < 2 sec in all fingers      LABS:                        11.2   11.64 )-----------( 252      ( 14 Oct 2023 05:48 )             33.8       10-13    141  |  105  |  16  ----------------------------<  121<H>  4.1   |  23  |  0.69    Ca    9.4      13 Oct 2023 16:00        
Orthopedics Post-Op Check  Patient was seen and examined at bedside. Denies CP/SOB/N/V/HA. Resting comfortably without complaints s/p left rTSA today. Pain is controlled.    Vital Signs Last 24 Hrs  T(C): 36.8 (13 Oct 2023 18:49), Max: 36.8 (13 Oct 2023 18:49)  T(F): 98.2 (13 Oct 2023 18:49), Max: 98.2 (13 Oct 2023 18:49)  HR: 85 (13 Oct 2023 18:49) (73 - 86)  BP: 131/68 (13 Oct 2023 18:49) (97/54 - 151/80)  BP(mean): 74 (13 Oct 2023 18:00) (64 - 108)  RR: 17 (13 Oct 2023 18:49) (14 - 23)  SpO2: 95% (13 Oct 2023 18:49) (94% - 100%)    Parameters below as of 13 Oct 2023 18:49  Patient On (Oxygen Delivery Method): room air    Labs:                        12.3   12.02 )-----------( 265      ( 13 Oct 2023 16:00 )             37.5     10-13    141  |  105  |  16  ----------------------------<  121<H>  4.1   |  23  |  0.69    Ca    9.4      13 Oct 2023 16:00    Physical Exam:  LUE:    Dressing clean/dry/intact  Median/ulnar/radial function intact motor and sensory  radial pulse 2+   Extremity warm  Capillary refill < 2 sec in all fingers

## 2023-10-19 ENCOUNTER — NON-APPOINTMENT (OUTPATIENT)
Age: 78
End: 2023-10-19

## 2023-10-25 ENCOUNTER — APPOINTMENT (OUTPATIENT)
Dept: ORTHOPEDIC SURGERY | Facility: CLINIC | Age: 78
End: 2023-10-25
Payer: MEDICARE

## 2023-10-25 VITALS
DIASTOLIC BLOOD PRESSURE: 76 MMHG | WEIGHT: 189 LBS | SYSTOLIC BLOOD PRESSURE: 150 MMHG | HEIGHT: 62 IN | BODY MASS INDEX: 34.78 KG/M2 | HEART RATE: 82 BPM

## 2023-10-25 PROBLEM — M19.90 UNSPECIFIED OSTEOARTHRITIS, UNSPECIFIED SITE: Chronic | Status: ACTIVE | Noted: 2023-10-03

## 2023-10-25 PROCEDURE — 99024 POSTOP FOLLOW-UP VISIT: CPT

## 2023-11-09 ENCOUNTER — APPOINTMENT (OUTPATIENT)
Dept: ORTHOPEDIC SURGERY | Facility: CLINIC | Age: 78
End: 2023-11-09
Payer: MEDICARE

## 2023-11-09 VITALS — WEIGHT: 189 LBS | BODY MASS INDEX: 34.78 KG/M2 | HEIGHT: 62 IN

## 2023-11-09 DIAGNOSIS — M67.912 UNSPECIFIED DISORDER OF SYNOVIUM AND TENDON, LEFT SHOULDER: ICD-10-CM

## 2023-11-09 PROCEDURE — 99024 POSTOP FOLLOW-UP VISIT: CPT

## 2023-12-21 ENCOUNTER — APPOINTMENT (OUTPATIENT)
Dept: ORTHOPEDIC SURGERY | Facility: CLINIC | Age: 78
End: 2023-12-21
Payer: MEDICARE

## 2023-12-21 VITALS — WEIGHT: 189 LBS | HEIGHT: 62 IN | BODY MASS INDEX: 34.78 KG/M2

## 2023-12-21 DIAGNOSIS — Z96.612 PRESENCE OF LEFT ARTIFICIAL SHOULDER JOINT: ICD-10-CM

## 2023-12-21 PROCEDURE — 99024 POSTOP FOLLOW-UP VISIT: CPT

## 2023-12-31 ENCOUNTER — EMERGENCY (EMERGENCY)
Facility: HOSPITAL | Age: 78
LOS: 1 days | Discharge: HOME | End: 2023-12-31
Attending: STUDENT IN AN ORGANIZED HEALTH CARE EDUCATION/TRAINING PROGRAM
Payer: MEDICARE

## 2023-12-31 VITALS
WEIGHT: 192.9 LBS | HEART RATE: 75 BPM | SYSTOLIC BLOOD PRESSURE: 175 MMHG | OXYGEN SATURATION: 98 % | RESPIRATION RATE: 18 BRPM | HEIGHT: 62 IN | DIASTOLIC BLOOD PRESSURE: 91 MMHG | TEMPERATURE: 98 F

## 2023-12-31 VITALS
DIASTOLIC BLOOD PRESSURE: 83 MMHG | HEART RATE: 73 BPM | SYSTOLIC BLOOD PRESSURE: 134 MMHG | RESPIRATION RATE: 16 BRPM | TEMPERATURE: 98 F | OXYGEN SATURATION: 96 %

## 2023-12-31 DIAGNOSIS — K40.90 UNILATERAL INGUINAL HERNIA, WITHOUT OBSTRUCTION OR GANGRENE, NOT SPECIFIED AS RECURRENT: Chronic | ICD-10-CM

## 2023-12-31 DIAGNOSIS — Z90.49 ACQUIRED ABSENCE OF OTHER SPECIFIED PARTS OF DIGESTIVE TRACT: Chronic | ICD-10-CM

## 2023-12-31 DIAGNOSIS — Z98.891 HISTORY OF UTERINE SCAR FROM PREVIOUS SURGERY: Chronic | ICD-10-CM

## 2023-12-31 DIAGNOSIS — Z98.890 OTHER SPECIFIED POSTPROCEDURAL STATES: Chronic | ICD-10-CM

## 2023-12-31 LAB
ALBUMIN SERPL ELPH-MCNC: 4 G/DL — SIGNIFICANT CHANGE UP (ref 3.5–5)
ALBUMIN SERPL ELPH-MCNC: 4 G/DL — SIGNIFICANT CHANGE UP (ref 3.5–5)
ALP SERPL-CCNC: 72 U/L — SIGNIFICANT CHANGE UP (ref 40–120)
ALP SERPL-CCNC: 72 U/L — SIGNIFICANT CHANGE UP (ref 40–120)
ALT FLD-CCNC: 66 U/L DA — HIGH (ref 10–60)
ALT FLD-CCNC: 66 U/L DA — HIGH (ref 10–60)
ANION GAP SERPL CALC-SCNC: 4 MMOL/L — LOW (ref 5–17)
ANION GAP SERPL CALC-SCNC: 4 MMOL/L — LOW (ref 5–17)
AST SERPL-CCNC: 46 U/L — HIGH (ref 10–40)
AST SERPL-CCNC: 46 U/L — HIGH (ref 10–40)
BASOPHILS # BLD AUTO: 0.02 K/UL — SIGNIFICANT CHANGE UP (ref 0–0.2)
BASOPHILS # BLD AUTO: 0.02 K/UL — SIGNIFICANT CHANGE UP (ref 0–0.2)
BASOPHILS NFR BLD AUTO: 0.5 % — SIGNIFICANT CHANGE UP (ref 0–2)
BASOPHILS NFR BLD AUTO: 0.5 % — SIGNIFICANT CHANGE UP (ref 0–2)
BILIRUB SERPL-MCNC: 0.6 MG/DL — SIGNIFICANT CHANGE UP (ref 0.2–1.2)
BILIRUB SERPL-MCNC: 0.6 MG/DL — SIGNIFICANT CHANGE UP (ref 0.2–1.2)
BLD GP AB SCN SERPL QL: SIGNIFICANT CHANGE UP
BLD GP AB SCN SERPL QL: SIGNIFICANT CHANGE UP
BUN SERPL-MCNC: 19 MG/DL — HIGH (ref 7–18)
BUN SERPL-MCNC: 19 MG/DL — HIGH (ref 7–18)
CALCIUM SERPL-MCNC: 8.6 MG/DL — SIGNIFICANT CHANGE UP (ref 8.4–10.5)
CALCIUM SERPL-MCNC: 8.6 MG/DL — SIGNIFICANT CHANGE UP (ref 8.4–10.5)
CHLORIDE SERPL-SCNC: 108 MMOL/L — SIGNIFICANT CHANGE UP (ref 96–108)
CHLORIDE SERPL-SCNC: 108 MMOL/L — SIGNIFICANT CHANGE UP (ref 96–108)
CO2 SERPL-SCNC: 28 MMOL/L — SIGNIFICANT CHANGE UP (ref 22–31)
CO2 SERPL-SCNC: 28 MMOL/L — SIGNIFICANT CHANGE UP (ref 22–31)
CREAT SERPL-MCNC: 0.73 MG/DL — SIGNIFICANT CHANGE UP (ref 0.5–1.3)
CREAT SERPL-MCNC: 0.73 MG/DL — SIGNIFICANT CHANGE UP (ref 0.5–1.3)
EGFR: 85 ML/MIN/1.73M2 — SIGNIFICANT CHANGE UP
EGFR: 85 ML/MIN/1.73M2 — SIGNIFICANT CHANGE UP
EOSINOPHIL # BLD AUTO: 0.07 K/UL — SIGNIFICANT CHANGE UP (ref 0–0.5)
EOSINOPHIL # BLD AUTO: 0.07 K/UL — SIGNIFICANT CHANGE UP (ref 0–0.5)
EOSINOPHIL NFR BLD AUTO: 1.6 % — SIGNIFICANT CHANGE UP (ref 0–6)
EOSINOPHIL NFR BLD AUTO: 1.6 % — SIGNIFICANT CHANGE UP (ref 0–6)
GLUCOSE SERPL-MCNC: 101 MG/DL — HIGH (ref 70–99)
GLUCOSE SERPL-MCNC: 101 MG/DL — HIGH (ref 70–99)
HCT VFR BLD CALC: 41.9 % — SIGNIFICANT CHANGE UP (ref 34.5–45)
HCT VFR BLD CALC: 41.9 % — SIGNIFICANT CHANGE UP (ref 34.5–45)
HGB BLD-MCNC: 13.6 G/DL — SIGNIFICANT CHANGE UP (ref 11.5–15.5)
HGB BLD-MCNC: 13.6 G/DL — SIGNIFICANT CHANGE UP (ref 11.5–15.5)
IMM GRANULOCYTES NFR BLD AUTO: 0.2 % — SIGNIFICANT CHANGE UP (ref 0–0.9)
IMM GRANULOCYTES NFR BLD AUTO: 0.2 % — SIGNIFICANT CHANGE UP (ref 0–0.9)
LIDOCAIN IGE QN: 33 U/L — SIGNIFICANT CHANGE UP (ref 13–75)
LIDOCAIN IGE QN: 33 U/L — SIGNIFICANT CHANGE UP (ref 13–75)
LYMPHOCYTES # BLD AUTO: 0.58 K/UL — LOW (ref 1–3.3)
LYMPHOCYTES # BLD AUTO: 0.58 K/UL — LOW (ref 1–3.3)
LYMPHOCYTES # BLD AUTO: 13.3 % — SIGNIFICANT CHANGE UP (ref 13–44)
LYMPHOCYTES # BLD AUTO: 13.3 % — SIGNIFICANT CHANGE UP (ref 13–44)
MAGNESIUM SERPL-MCNC: 2.2 MG/DL — SIGNIFICANT CHANGE UP (ref 1.6–2.6)
MAGNESIUM SERPL-MCNC: 2.2 MG/DL — SIGNIFICANT CHANGE UP (ref 1.6–2.6)
MCHC RBC-ENTMCNC: 28.9 PG — SIGNIFICANT CHANGE UP (ref 27–34)
MCHC RBC-ENTMCNC: 28.9 PG — SIGNIFICANT CHANGE UP (ref 27–34)
MCHC RBC-ENTMCNC: 32.5 GM/DL — SIGNIFICANT CHANGE UP (ref 32–36)
MCHC RBC-ENTMCNC: 32.5 GM/DL — SIGNIFICANT CHANGE UP (ref 32–36)
MCV RBC AUTO: 89 FL — SIGNIFICANT CHANGE UP (ref 80–100)
MCV RBC AUTO: 89 FL — SIGNIFICANT CHANGE UP (ref 80–100)
MONOCYTES # BLD AUTO: 0.37 K/UL — SIGNIFICANT CHANGE UP (ref 0–0.9)
MONOCYTES # BLD AUTO: 0.37 K/UL — SIGNIFICANT CHANGE UP (ref 0–0.9)
MONOCYTES NFR BLD AUTO: 8.5 % — SIGNIFICANT CHANGE UP (ref 2–14)
MONOCYTES NFR BLD AUTO: 8.5 % — SIGNIFICANT CHANGE UP (ref 2–14)
NEUTROPHILS # BLD AUTO: 3.32 K/UL — SIGNIFICANT CHANGE UP (ref 1.8–7.4)
NEUTROPHILS # BLD AUTO: 3.32 K/UL — SIGNIFICANT CHANGE UP (ref 1.8–7.4)
NEUTROPHILS NFR BLD AUTO: 75.9 % — SIGNIFICANT CHANGE UP (ref 43–77)
NEUTROPHILS NFR BLD AUTO: 75.9 % — SIGNIFICANT CHANGE UP (ref 43–77)
NRBC # BLD: 0 /100 WBCS — SIGNIFICANT CHANGE UP (ref 0–0)
NRBC # BLD: 0 /100 WBCS — SIGNIFICANT CHANGE UP (ref 0–0)
PLATELET # BLD AUTO: 155 K/UL — SIGNIFICANT CHANGE UP (ref 150–400)
PLATELET # BLD AUTO: 155 K/UL — SIGNIFICANT CHANGE UP (ref 150–400)
POTASSIUM SERPL-MCNC: 4.5 MMOL/L — SIGNIFICANT CHANGE UP (ref 3.5–5.3)
POTASSIUM SERPL-MCNC: 4.5 MMOL/L — SIGNIFICANT CHANGE UP (ref 3.5–5.3)
POTASSIUM SERPL-SCNC: 4.5 MMOL/L — SIGNIFICANT CHANGE UP (ref 3.5–5.3)
POTASSIUM SERPL-SCNC: 4.5 MMOL/L — SIGNIFICANT CHANGE UP (ref 3.5–5.3)
PROT SERPL-MCNC: 6.8 G/DL — SIGNIFICANT CHANGE UP (ref 6–8.3)
PROT SERPL-MCNC: 6.8 G/DL — SIGNIFICANT CHANGE UP (ref 6–8.3)
RBC # BLD: 4.71 M/UL — SIGNIFICANT CHANGE UP (ref 3.8–5.2)
RBC # BLD: 4.71 M/UL — SIGNIFICANT CHANGE UP (ref 3.8–5.2)
RBC # FLD: 14.5 % — SIGNIFICANT CHANGE UP (ref 10.3–14.5)
RBC # FLD: 14.5 % — SIGNIFICANT CHANGE UP (ref 10.3–14.5)
SODIUM SERPL-SCNC: 140 MMOL/L — SIGNIFICANT CHANGE UP (ref 135–145)
SODIUM SERPL-SCNC: 140 MMOL/L — SIGNIFICANT CHANGE UP (ref 135–145)
WBC # BLD: 4.37 K/UL — SIGNIFICANT CHANGE UP (ref 3.8–10.5)
WBC # BLD: 4.37 K/UL — SIGNIFICANT CHANGE UP (ref 3.8–10.5)
WBC # FLD AUTO: 4.37 K/UL — SIGNIFICANT CHANGE UP (ref 3.8–10.5)
WBC # FLD AUTO: 4.37 K/UL — SIGNIFICANT CHANGE UP (ref 3.8–10.5)

## 2023-12-31 PROCEDURE — 83735 ASSAY OF MAGNESIUM: CPT

## 2023-12-31 PROCEDURE — 86850 RBC ANTIBODY SCREEN: CPT

## 2023-12-31 PROCEDURE — 96374 THER/PROPH/DIAG INJ IV PUSH: CPT | Mod: XU

## 2023-12-31 PROCEDURE — 36415 COLL VENOUS BLD VENIPUNCTURE: CPT

## 2023-12-31 PROCEDURE — 96361 HYDRATE IV INFUSION ADD-ON: CPT

## 2023-12-31 PROCEDURE — 74177 CT ABD & PELVIS W/CONTRAST: CPT | Mod: MA

## 2023-12-31 PROCEDURE — 96375 TX/PRO/DX INJ NEW DRUG ADDON: CPT

## 2023-12-31 PROCEDURE — 74177 CT ABD & PELVIS W/CONTRAST: CPT | Mod: 26,MA

## 2023-12-31 PROCEDURE — 86900 BLOOD TYPING SEROLOGIC ABO: CPT

## 2023-12-31 PROCEDURE — 85025 COMPLETE CBC W/AUTO DIFF WBC: CPT

## 2023-12-31 PROCEDURE — 86901 BLOOD TYPING SEROLOGIC RH(D): CPT

## 2023-12-31 PROCEDURE — 99285 EMERGENCY DEPT VISIT HI MDM: CPT

## 2023-12-31 PROCEDURE — 80053 COMPREHEN METABOLIC PANEL: CPT

## 2023-12-31 PROCEDURE — 83690 ASSAY OF LIPASE: CPT

## 2023-12-31 PROCEDURE — 99284 EMERGENCY DEPT VISIT MOD MDM: CPT | Mod: 25

## 2023-12-31 RX ORDER — ONDANSETRON 8 MG/1
4 TABLET, FILM COATED ORAL ONCE
Refills: 0 | Status: COMPLETED | OUTPATIENT
Start: 2023-12-31 | End: 2023-12-31

## 2023-12-31 RX ORDER — SODIUM CHLORIDE 9 MG/ML
1000 INJECTION INTRAMUSCULAR; INTRAVENOUS; SUBCUTANEOUS ONCE
Refills: 0 | Status: COMPLETED | OUTPATIENT
Start: 2023-12-31 | End: 2023-12-31

## 2023-12-31 RX ORDER — FAMOTIDINE 10 MG/ML
20 INJECTION INTRAVENOUS ONCE
Refills: 0 | Status: COMPLETED | OUTPATIENT
Start: 2023-12-31 | End: 2023-12-31

## 2023-12-31 RX ADMIN — ONDANSETRON 4 MILLIGRAM(S): 8 TABLET, FILM COATED ORAL at 13:21

## 2023-12-31 RX ADMIN — FAMOTIDINE 20 MILLIGRAM(S): 10 INJECTION INTRAVENOUS at 13:21

## 2023-12-31 RX ADMIN — SODIUM CHLORIDE 1000 MILLILITER(S): 9 INJECTION INTRAMUSCULAR; INTRAVENOUS; SUBCUTANEOUS at 14:27

## 2023-12-31 RX ADMIN — SODIUM CHLORIDE 1000 MILLILITER(S): 9 INJECTION INTRAMUSCULAR; INTRAVENOUS; SUBCUTANEOUS at 13:21

## 2023-12-31 NOTE — ED PROVIDER NOTE - PATIENT PORTAL LINK FT
You can access the FollowMyHealth Patient Portal offered by [facility] by registering at the following website: [email or web address]. By joining [employer]’s FollowMyHealth portal, you will also be able to view your health information using other applications (apps) compatible with our system. You can access the FollowMyHealth Patient Portal offered by [facility] by registering at the following website: [email or web address]. By joining [employer]’s FollowMyHealth portal, you will also be able to view your health information using other applications (apps) compatible with our system.

## 2023-12-31 NOTE — ED PROVIDER NOTE - PHYSICAL EXAMINATION
General: well appearing female, no acute distress   HEENT: normocephalic, atraumatic   Respiratory: normal work of breathing  Abdomen: soft, diffuse tenderness to palpation   MSK: no swelling or tenderness of lower extremities, moving all extremities spontaneously   Skin: warm, dry   Neuro: A&Ox3  Psych: appropriate affect

## 2023-12-31 NOTE — ED PROVIDER NOTE - CLINICAL SUMMARY MEDICAL DECISION MAKING FREE TEXT BOX
78-year-old female presenting with abdominal pain and black stools.  Concern for diverticulitis versus GI bleed.  Will obtain labs and CT abdomen to assess.

## 2023-12-31 NOTE — ED ADULT NURSE NOTE - NSFALLUNIVINTERV_ED_ALL_ED
Bed/Stretcher in lowest position, wheels locked, appropriate side rails in place/Call bell, personal items and telephone in reach/Instruct patient to call for assistance before getting out of bed/chair/stretcher/Non-slip footwear applied when patient is off stretcher/[place] to call system/Physically safe environment - no spills, clutter or unnecessary equipment/Purposeful proactive rounding/Room/bathroom lighting operational, light cord in reach Bed/Stretcher in lowest position, wheels locked, appropriate side rails in place/Call bell, personal items and telephone in reach/Instruct patient to call for assistance before getting out of bed/chair/stretcher/Non-slip footwear applied when patient is off stretcher/[place] to call system/Physically safe environment - no spills, clutter or unnecessary equipment/Purposeful proactive rounding/Room/bathroom lighting operational, light cord in reach

## 2023-12-31 NOTE — ED PROVIDER NOTE - PROGRESS NOTE DETAILS
Luis: Signout from Dr. Sawant to follow-up CT and anticipate discharge if no significant findings Smith: CT without acute findings  DC

## 2023-12-31 NOTE — ED PROVIDER NOTE - OBJECTIVE STATEMENT
78-year-old female, PMH of HTN, HLD, presenting with abdominal pain, vomiting and diarrhea.  Patient reports she is soft stool today that was black.  Was seen by her PCP earlier this week for similar symptoms and was given a prescription for CT scan but told to go to the hospital if she felt any worse.  Denies any fever, chest pain or trouble breathing.

## 2024-07-11 NOTE — ASU PREOP CHECKLIST - TO WHOM
"Rapid strep negative.  COVID pending  Drink plenty of fluids and rest.  May use salt water gargles- about 8 oz warm water with about 1 teaspoon salt  Over the counter pain relievers such as Tylenol or ibuprofen may be used as needed.   Honey lemon tea helps to soothe the throat. \"Throat Coat\" tea is soothing as well.  Please follow up with primary care provider if not improving, worsening or new symptoms.  " ISAMAR RAUSCH TO SANIA RAUSCH

## 2024-09-18 ENCOUNTER — APPOINTMENT (OUTPATIENT)
Dept: OBGYN | Facility: CLINIC | Age: 79
End: 2024-09-18

## 2024-11-08 ENCOUNTER — APPOINTMENT (OUTPATIENT)
Dept: ORTHOPEDIC SURGERY | Facility: CLINIC | Age: 79
End: 2024-11-08
Payer: MEDICARE

## 2024-11-08 VITALS — HEIGHT: 62 IN | BODY MASS INDEX: 33.13 KG/M2 | WEIGHT: 180 LBS

## 2024-11-08 DIAGNOSIS — M17.0 BILATERAL PRIMARY OSTEOARTHRITIS OF KNEE: ICD-10-CM

## 2024-11-08 DIAGNOSIS — Z96.612 PRESENCE OF LEFT ARTIFICIAL SHOULDER JOINT: ICD-10-CM

## 2024-11-08 DIAGNOSIS — M71.22 SYNOVIAL CYST OF POPLITEAL SPACE [BAKER], LEFT KNEE: ICD-10-CM

## 2024-11-08 PROCEDURE — 73030 X-RAY EXAM OF SHOULDER: CPT | Mod: LT

## 2024-11-08 PROCEDURE — 99213 OFFICE O/P EST LOW 20 MIN: CPT

## 2024-11-08 PROCEDURE — 73562 X-RAY EXAM OF KNEE 3: CPT | Mod: LT

## 2024-11-11 ENCOUNTER — NON-APPOINTMENT (OUTPATIENT)
Age: 79
End: 2024-11-11

## 2024-11-12 ENCOUNTER — APPOINTMENT (OUTPATIENT)
Dept: OBGYN | Facility: CLINIC | Age: 79
End: 2024-11-12
Payer: MEDICARE

## 2024-11-12 VITALS
SYSTOLIC BLOOD PRESSURE: 143 MMHG | HEIGHT: 62 IN | BODY MASS INDEX: 33.13 KG/M2 | HEART RATE: 77 BPM | TEMPERATURE: 94.1 F | WEIGHT: 180 LBS | DIASTOLIC BLOOD PRESSURE: 79 MMHG | OXYGEN SATURATION: 97 %

## 2024-11-12 DIAGNOSIS — Z01.419 ENCOUNTER FOR GYNECOLOGICAL EXAMINATION (GENERAL) (ROUTINE) W/OUT ABNORMAL FINDINGS: ICD-10-CM

## 2024-11-12 PROCEDURE — 99459 PELVIC EXAMINATION: CPT

## 2024-11-12 PROCEDURE — 99387 INIT PM E/M NEW PAT 65+ YRS: CPT

## 2024-11-12 RX ORDER — CARVEDILOL 12.5 MG/1
12.5 TABLET, FILM COATED ORAL
Refills: 0 | Status: ACTIVE | COMMUNITY

## 2024-11-12 RX ORDER — PANTOPRAZOLE 40 MG/1
40 TABLET, DELAYED RELEASE ORAL
Refills: 0 | Status: ACTIVE | COMMUNITY

## 2024-11-12 RX ORDER — GABAPENTIN 300 MG/1
300 CAPSULE ORAL
Refills: 0 | Status: ACTIVE | COMMUNITY

## 2024-11-13 LAB — HPV HIGH+LOW RISK DNA PNL CVX: NOT DETECTED

## 2024-11-16 LAB — CYTOLOGY CVX/VAG DOC THIN PREP: ABNORMAL

## 2025-04-04 NOTE — ED ADULT NURSE NOTE - NSSUHOSCREENINGYN_ED_ALL_ED
[facility] accepted patient, room assigned upon arrival. Report given to [phone] when he leaves. [place] slip faxed to [phone] at this time. Electronically signed by Callie Anthony RN on 4/4/2025 at 7:09 PM     Yes - the patient is able to be screened

## (undated) DEVICE — PACK TOTAL JOINT

## (undated) DEVICE — SUT VICRYL 2-0 27" FS-1 UNDYED

## (undated) DEVICE — NDL HYPO SAFE 22G X 1.5" (BLACK)

## (undated) DEVICE — CANISTER DISPOSABLE THIN WALL 3000CC

## (undated) DEVICE — HANDPIECE INTERPULSE W/ COAXIAL FAN SPRAY TIP

## (undated) DEVICE — LIJ-BIOMET VERSA-DIAL HUMERAL HEADS: Type: DURABLE MEDICAL EQUIPMENT

## (undated) DEVICE — DRAPE IOBAN 33" X 23"

## (undated) DEVICE — WARMING BLANKET LOWER ADULT

## (undated) DEVICE — SOL IRR POUR H2O 1500ML

## (undated) DEVICE — SUT VICRYL 0 27" OS-6 UNDYED

## (undated) DEVICE — ELCTR BOVIE PENCIL SMOKE EVACUATION

## (undated) DEVICE — GLV 8 PROTEXIS (CREAM) MICRO

## (undated) DEVICE — SUCTION YANKAUER NO CONTROL VENT

## (undated) DEVICE — DRAPE SPLIT SHEET 77" X 120"

## (undated) DEVICE — SUT MONOCRYL 4-0 27" PS-2 UNDYED

## (undated) DEVICE — SYR CATH TIP 2 OZ

## (undated) DEVICE — SOL IRR POUR NS 0.9% 1000ML

## (undated) DEVICE — SOL IRR BAG NS 0.9% 3000ML

## (undated) DEVICE — GLV 7.5 PROTEXIS (WHITE)

## (undated) DEVICE — SLING SHOULDER IMMOBILIZER CLINIC LARGE

## (undated) DEVICE — SUT ETHIBOND 5 4-30" CCS

## (undated) DEVICE — PACK LIJ BASIC ORTHO

## (undated) DEVICE — DRAPE 3/4 SHEET 52X76"

## (undated) DEVICE — DRSG STOCKINETTE IMPERVIOUS XL

## (undated) DEVICE — DRILL BIT BIOMET 2.7MM

## (undated) DEVICE — ELCTR GROUNDING PAD ADULT COVIDIEN

## (undated) DEVICE — SYR LUER LOK 20CC

## (undated) DEVICE — SUT VICRYL 1 36" CT-1 UNDYED

## (undated) DEVICE — DRILL BIT BIOMET 3.2MM

## (undated) DEVICE — VENODYNE/SCD SLEEVE CALF MEDIUM

## (undated) DEVICE — DRSG STERISTRIPS 0.5 X 4"

## (undated) DEVICE — STRYKER MIXEVAC 3 BONE CEMENT MIXER

## (undated) DEVICE — LABELS BLANK W PEN

## (undated) DEVICE — DRAPE MAYO STAND 23"

## (undated) DEVICE — POSITIONER STRAP ARMBOARD VELCRO TS-30